# Patient Record
Sex: FEMALE | Race: WHITE | Employment: PART TIME | URBAN - METROPOLITAN AREA
[De-identification: names, ages, dates, MRNs, and addresses within clinical notes are randomized per-mention and may not be internally consistent; named-entity substitution may affect disease eponyms.]

---

## 2021-02-15 ENCOUNTER — OFFICE VISIT (OUTPATIENT)
Dept: PRIMARY CARE CLINIC | Age: 21
End: 2021-02-15
Payer: COMMERCIAL

## 2021-02-15 VITALS
RESPIRATION RATE: 18 BRPM | WEIGHT: 181.2 LBS | HEIGHT: 63 IN | DIASTOLIC BLOOD PRESSURE: 74 MMHG | OXYGEN SATURATION: 100 % | BODY MASS INDEX: 32.11 KG/M2 | HEART RATE: 82 BPM | SYSTOLIC BLOOD PRESSURE: 116 MMHG

## 2021-02-15 DIAGNOSIS — Z00.00 PREVENTATIVE HEALTH CARE: ICD-10-CM

## 2021-02-15 DIAGNOSIS — M54.9 UPPER BACK PAIN ON LEFT SIDE: Primary | ICD-10-CM

## 2021-02-15 DIAGNOSIS — Z76.89 ENCOUNTER TO ESTABLISH CARE: ICD-10-CM

## 2021-02-15 LAB
ALBUMIN SERPL-MCNC: 4.7 G/DL (ref 3.5–4.6)
ALP BLD-CCNC: 83 U/L (ref 40–130)
ALT SERPL-CCNC: 14 U/L (ref 0–33)
ANION GAP SERPL CALCULATED.3IONS-SCNC: 13 MEQ/L (ref 9–15)
AST SERPL-CCNC: 15 U/L (ref 0–35)
BASOPHILS ABSOLUTE: 0.1 K/UL (ref 0–0.2)
BASOPHILS RELATIVE PERCENT: 0.5 %
BILIRUB SERPL-MCNC: <0.2 MG/DL (ref 0.2–0.7)
BUN BLDV-MCNC: 7 MG/DL (ref 6–20)
CALCIUM SERPL-MCNC: 10 MG/DL (ref 8.5–9.9)
CHLORIDE BLD-SCNC: 103 MEQ/L (ref 95–107)
CO2: 25 MEQ/L (ref 20–31)
CREAT SERPL-MCNC: 0.59 MG/DL (ref 0.5–0.9)
EOSINOPHILS ABSOLUTE: 0.1 K/UL (ref 0–0.7)
EOSINOPHILS RELATIVE PERCENT: 1.1 %
GFR AFRICAN AMERICAN: >60
GFR NON-AFRICAN AMERICAN: >60
GLOBULIN: 3.8 G/DL (ref 2.3–3.5)
GLUCOSE BLD-MCNC: 92 MG/DL (ref 70–99)
HBA1C MFR BLD: 5.6 % (ref 4.8–5.9)
HCT VFR BLD CALC: 45.9 % (ref 37–47)
HEMOGLOBIN: 14.7 G/DL (ref 12–16)
LYMPHOCYTES ABSOLUTE: 2.5 K/UL (ref 1–4.8)
LYMPHOCYTES RELATIVE PERCENT: 20.4 %
MCH RBC QN AUTO: 28.4 PG (ref 27–31.3)
MCHC RBC AUTO-ENTMCNC: 32 % (ref 33–37)
MCV RBC AUTO: 88.6 FL (ref 82–100)
MONOCYTES ABSOLUTE: 0.7 K/UL (ref 0.2–0.8)
MONOCYTES RELATIVE PERCENT: 5.6 %
NEUTROPHILS ABSOLUTE: 9 K/UL (ref 1.4–6.5)
NEUTROPHILS RELATIVE PERCENT: 72.4 %
PDW BLD-RTO: 13.6 % (ref 11.5–14.5)
PLATELET # BLD: 345 K/UL (ref 130–400)
POTASSIUM SERPL-SCNC: 4.5 MEQ/L (ref 3.4–4.9)
RBC # BLD: 5.18 M/UL (ref 4.2–5.4)
SODIUM BLD-SCNC: 141 MEQ/L (ref 135–144)
TOTAL PROTEIN: 8.5 G/DL (ref 6.3–8)
TSH REFLEX: 0.95 UIU/ML (ref 0.44–3.86)
WBC # BLD: 12.4 K/UL (ref 4.8–10.8)

## 2021-02-15 PROCEDURE — G8427 DOCREV CUR MEDS BY ELIG CLIN: HCPCS | Performed by: INTERNAL MEDICINE

## 2021-02-15 PROCEDURE — G8484 FLU IMMUNIZE NO ADMIN: HCPCS | Performed by: INTERNAL MEDICINE

## 2021-02-15 PROCEDURE — G8417 CALC BMI ABV UP PARAM F/U: HCPCS | Performed by: INTERNAL MEDICINE

## 2021-02-15 PROCEDURE — 1036F TOBACCO NON-USER: CPT | Performed by: INTERNAL MEDICINE

## 2021-02-15 PROCEDURE — 99204 OFFICE O/P NEW MOD 45 MIN: CPT | Performed by: INTERNAL MEDICINE

## 2021-02-15 RX ORDER — GABAPENTIN 100 MG/1
100 CAPSULE ORAL 3 TIMES DAILY PRN
Qty: 90 CAPSULE | Refills: 1 | Status: SHIPPED | OUTPATIENT
Start: 2021-02-15 | End: 2021-06-02

## 2021-02-15 RX ORDER — KETOROLAC TROMETHAMINE 10 MG/1
10 TABLET, FILM COATED ORAL EVERY 6 HOURS PRN
Qty: 20 TABLET | Refills: 0 | Status: SHIPPED | OUTPATIENT
Start: 2021-02-15 | End: 2022-02-15

## 2021-02-15 SDOH — HEALTH STABILITY: MENTAL HEALTH: HOW MANY STANDARD DRINKS CONTAINING ALCOHOL DO YOU HAVE ON A TYPICAL DAY?: NOT ASKED

## 2021-02-15 SDOH — HEALTH STABILITY: MENTAL HEALTH: HOW OFTEN DO YOU HAVE A DRINK CONTAINING ALCOHOL?: NOT ASKED

## 2021-02-15 SDOH — ECONOMIC STABILITY: TRANSPORTATION INSECURITY
IN THE PAST 12 MONTHS, HAS LACK OF TRANSPORTATION KEPT YOU FROM MEETINGS, WORK, OR FROM GETTING THINGS NEEDED FOR DAILY LIVING?: NO

## 2021-02-15 SDOH — ECONOMIC STABILITY: FOOD INSECURITY: WITHIN THE PAST 12 MONTHS, YOU WORRIED THAT YOUR FOOD WOULD RUN OUT BEFORE YOU GOT MONEY TO BUY MORE.: NEVER TRUE

## 2021-02-15 ASSESSMENT — PATIENT HEALTH QUESTIONNAIRE - PHQ9
SUM OF ALL RESPONSES TO PHQ QUESTIONS 1-9: 0
2. FEELING DOWN, DEPRESSED OR HOPELESS: 0
SUM OF ALL RESPONSES TO PHQ QUESTIONS 1-9: 0
SUM OF ALL RESPONSES TO PHQ9 QUESTIONS 1 & 2: 0
1. LITTLE INTEREST OR PLEASURE IN DOING THINGS: 0
SUM OF ALL RESPONSES TO PHQ QUESTIONS 1-9: 0

## 2021-02-15 ASSESSMENT — ENCOUNTER SYMPTOMS
COUGH: 0
DIARRHEA: 0
ABDOMINAL PAIN: 0
WHEEZING: 0
VOMITING: 0
NAUSEA: 0
BACK PAIN: 1
SHORTNESS OF BREATH: 0

## 2021-02-15 NOTE — PROGRESS NOTES
Subjective:      Patient ID: Ike White is a 24 y.o. female  New patient  HPI  Patient presents to establish care with PCP today. PMHx:anxiety. Denies hx DM   Current meds: none  Family hx of colon cancer: none  Last pap test: never          CC: left upper back pain x 2 months  Pain is crampy, shooting by the left shoulder blade rated 8/10, radiating down the left arm. Assoc arm tingling and numbness, no weakness, no swelling of back. Hx fall in 12/2020. XR shoulder and scapula showed no fracture or dislocation. No help from ibuprofen, no neck pain. Past Medical History:   Diagnosis Date    Anxiety     Depression     Diabetes mellitus (HCC)     Type 2 diabetes mellitus without complication ([facility] [address]      History reviewed. No pertinent surgical history. Social History     Socioeconomic History    Marital status: Single     Spouse name: Not on file    Number of children: Not on file    Years of education: Not on file    Highest education level: Not on file   Occupational History    Not on file   Social Needs    Financial resource strain: Somewhat hard    Food insecurity     Worry: Never true     Inability: Never true    Transportation needs     Medical: No     Non-medical: No   Tobacco Use    Smoking status: Never Smoker    Smokeless tobacco: Never Used   Substance and Sexual Activity    Alcohol use:  Yes    Drug use: Never    Sexual activity: Not on file   Lifestyle    Physical activity     Days per week: Not on file     Minutes per session: Not on file    Stress: Not on file   Relationships    Social connections     Talks on phone: Not on file     Gets together: Not on file     Attends Hindu service: Not on file     Active member of club or organization: Not on file     Attends meetings of clubs or organizations: Not on file     Relationship status: Not on file    Intimate partner violence     Fear of current or ex partner: Not on file     Emotionally abused: Not on file Physically abused: Not on file     Forced sexual activity: Not on file   Other Topics Concern    Not on file   Social History Narrative    Not on file     History reviewed. No pertinent family history. Allergies:  Patient has no known allergies. There is no problem list on file for this patient. No current outpatient medications on file prior to visit. No current facility-administered medications on file prior to visit. Review of Systems   Constitutional: Negative for chills, diaphoresis, fatigue and fever. HENT: Negative for congestion, ear discharge and ear pain. Respiratory: Negative for cough, shortness of breath and wheezing. Cardiovascular: Negative for chest pain. Gastrointestinal: Negative for abdominal pain, diarrhea, nausea and vomiting. Endocrine: Negative for cold intolerance and heat intolerance. Genitourinary: Negative for dysuria and frequency. Musculoskeletal: Positive for back pain. Negative for arthralgias, joint swelling and neck pain. Neurological: Negative for dizziness and light-headedness. Psychiatric/Behavioral: Negative for dysphoric mood. The patient is not nervous/anxious. Objective:   /74 (Site: Left Upper Arm, Position: Sitting, Cuff Size: Medium Adult)   Pulse 82   Resp 18   Ht 5' 3\" (1.6 m)   Wt 181 lb 3.2 oz (82.2 kg)   LMP 10/06/2020 (Approximate)   SpO2 100%   Breastfeeding No   BMI 32.10 kg/m²     Physical Exam  Constitutional:       General: She is not in acute distress. Appearance: Normal appearance. She is not diaphoretic. Cardiovascular:      Rate and Rhythm: Normal rate and regular rhythm. Pulses: Normal pulses. Heart sounds: Normal heart sounds, S1 normal and S2 normal. No murmur. Pulmonary:      Effort: Pulmonary effort is normal. No respiratory distress. Breath sounds: Normal breath sounds. No wheezing or rales. Chest:      Chest wall: No tenderness.    Abdominal: General: Bowel sounds are normal.      Tenderness: There is no abdominal tenderness. Musculoskeletal:      Comments: No erythema or swelling of the back   Marked left infrascapular TTP  No weakness of UE   Neurological:      General: No focal deficit present. Mental Status: She is alert. Cranial Nerves: No cranial nerve deficit. Motor: No weakness. Psychiatric:         Mood and Affect: Mood normal.         Thought Content: Thought content normal.       Assessment:       Diagnosis Orders   1. Upper back pain on left side  [facility]    ketorolac (TORADOL) 10 MG tablet    gabapentin (NEURONTIN) 100 MG capsule   2. Preventative health care  CBC With Auto Differential    Comprehensive Metabolic Panel    TSH with Reflex    HIV Screen    Hemoglobin A1C    Chlamydia trachomatis DNA, Urine    Vero Steve MD, OB-GYN, [place]   3.  Encounter to establish care       Plan:      Orders Placed This Encounter   Procedures    Chlamydia trachomatis DNA, Urine     Standing Status:   Future     Number of Occurrences:   1     Standing Expiration Date:   2/15/2022    CBC With Auto Differential     Standing Status:   Future     Number of Occurrences:   1     Standing Expiration Date:   2/15/2022    Comprehensive Metabolic Panel     Standing Status:   Future     Number of Occurrences:   1     Standing Expiration Date:   2/15/2022    TSH with Reflex     Standing Status:   Future     Number of Occurrences:   1     Standing Expiration Date:   2/15/2022    HIV Screen     Standing Status:   Future     Number of Occurrences:   1     Standing Expiration Date:   2/15/2022    Hemoglobin A1C     Standing Status:   Future     Number of Occurrences:   1     Standing Expiration Date:   2/15/2022   Winnie Cleveland MD, OB-GYN, [place]     Referral Priority:   Routine     Referral Type:   Eval and Treat     Referral Reason:   Specialty Services Required Referred to Provider:   Khadra Mcintosh MD     Requested Specialty:   Obstetrics & Gynecology     Number of Visits Requested:   [address]     Referral Priority:   Routine     Referral Type:   Eval and Treat     Referral Reason:   Specialty Services Required     Requested Specialty:   Physical Therapy     Number of Visits Requested:   1     Orders Placed This Encounter   Medications    ketorolac (TORADOL) 10 MG tablet     Sig: Take 1 tablet by mouth every 6 hours as needed for Pain     Dispense:  20 tablet     Refill:  0    gabapentin (NEURONTIN) 100 MG capsule     Sig: Take 1 capsule by mouth 3 times daily as needed (numbness, tingling) for up to 30 days. Intended supply: 90 days     Dispense:  90 capsule     Refill:  1     Return in about 1 month (around 3/15/2021) for assessment of response to treatment.

## 2021-02-16 DIAGNOSIS — D72.829 LEUKOCYTOSIS, UNSPECIFIED TYPE: Primary | ICD-10-CM

## 2021-02-18 LAB — HIV AG/AB: NONREACTIVE

## 2021-02-19 LAB — C. TRACHOMATIS DNA ,URINE: NEGATIVE

## 2021-02-25 ENCOUNTER — HOSPITAL ENCOUNTER (OUTPATIENT)
Dept: PHYSICAL THERAPY | Age: 21
Setting detail: THERAPIES SERIES
Discharge: HOME | End: 2021-02-25
Payer: COMMERCIAL

## 2021-02-25 PROCEDURE — 97162 PT EVAL MOD COMPLEX 30 MIN: CPT

## 2021-02-25 PROCEDURE — 97110 THERAPEUTIC EXERCISES: CPT

## 2021-02-25 ASSESSMENT — PAIN DESCRIPTION - ORIENTATION: ORIENTATION: LEFT

## 2021-02-25 ASSESSMENT — PAIN SCALES - GENERAL: PAINLEVEL_OUTOF10: 5

## 2021-02-25 NOTE — PLAN OF CARE
[address]   [address] [place], [place]  Phone:  [phone]   Fax:  [phone]    [] Certification  [] Recertification [x]  Plan of Care  [] Progress Note   [] Discharge        To: Purnima Stack MD  From:  Sena Navarro, ANGÉLICA  Patient: Russell Duffy     : 2000  Diagnosis: Upper back pain on left side     Date: 2021  Treatment Diagnosis: decreased UE strength, decreased left shoulder ROM, left scapula pain       Progress Report Period from: 21  to 2021    Total # of Visits to Date: 1   No Show: 0    Canceled Appointment: 0     OBJECTIVE:   Short Term Goals - Time Frame for Short term goals: 4 weeks    Goals Current/Discharge status  Met   Short term goal 1: Patient will report </= 1/10 pain in left upper back and scapula with work activities. Patient reports 5/10 pain in left upper back and scapula at rest. [] yes  [x] no   Short term goal 2: Patient will be independent with HEP. Patient issued HEP. [] yes  [x] no     Long Term Goals - Time Frame for Long term goals : 6 weeks  Goals Current/ Discharge status Met   Long term goal 1: Patient will increase left shoulder ROM to [facility] for improved reach. AROM LUE (degrees)  L Shoulder Flexion 0-180: 140 deg (stiffness at end range)  L Shoulder Extension 0-45: 46 deg  L Shoulder ABduction 0-180: 96 deg (stiffness at end range)  L Shoulder Int Rotation  0-70: 20 deg  L Shoulder Ext Rotation  0-90: 31 deg          yes  [x] no   Long term goal 2: Patient will increase strength in left shoulder and periscapulars >/= 1/2 manual muscle grade for improved tolerance.        Strength RUE  Comment: rhomboid/middle trap 3+/5  R Shoulder Flexion: 4/5  R Shoulder Extension: 4/5  R Shoulder ABduction: 4/5  R Shoulder Internal Rotation: 4/5  R Shoulder External Rotation: 4/5  R Elbow Flexion: 4/5  R Elbow Extension: 4-/5  R Wrist Flexion: 4+/5  R Wrist Extension: 4+/5  Strength LUE  Comment: rhomboid/middle trap 3/5  L Shoulder Flexion: 4/5  L Shoulder Extension: 4/5  L Shoulder ABduction: 4/5  L Shoulder Internal Rotation: 4/5  L Shoulder External Rotation: 4/5  L Elbow Flexion: 4/5  L Elbow Extension: 4/5  L Wrist Flexion: 4+/5  L Wrist Extension: 4+/5      [] yes  [x] no   Long term goal 3: UEFI >/= 62/80 to demonstrate functional improvements. UEFI: 52/80 [] yes  [x] no     Body structures, Functions, Activity limitations: Decreased ROM, Decreased strength, Increased pain, Decreased sensation, Decreased ADL status  Assessment: Patient reports fall in December onto left scapula reporting continued pain and numbness/tingling down left arm. Upon PT evaluation, patient demonstrates decline in left shoulder ROM and strength. Further PT recommended to decrease pain and improve ROM/strength for overall quality of life. Prognosis: Good  New Education Provided: PT Education: Goals, PT Role, Plan of Care, Home Exercise Program    PLAN: [x] Evaluate and Treat  Frequency/Duration:  Plan  Times per week: 2  Plan weeks: 6  Current Treatment Recommendations: Strengthening, ROM, Endurance Training, Neuromuscular Re-education, Manual Therapy - Soft Tissue Mobilization, Manual Therapy - Joint Manipulation, Home Exercise Program, Modalities, Patient/Caregiver Education & Training     Precautions:             Patient Status:[x] Continue/ Initate plan of Care    [] Discharge PT. Recommend pt continue with HEP. [] Additional visits requested, Please re-certify for additional visits:        Signature: Electronically signed by Jose M Rvias PT on 2/25/21 at 12:52 PM EST      If you have any questions or concerns, please don't hesitate to call. Thank you for your referral.    I have reviewed this plan of care and certify a need for medically necessary rehabilitation services.     Physician Signature:__________________________________________________________  Date:  Please sign and return

## 2021-02-25 NOTE — PROGRESS NOTES
[facility]   Outpatient Physical Therapy   Evaluation      [] [address]  [x] [address] [facility]     Date: 2021  Patient: Reggie Arceo  : 2000  ACCT #: [de-identified]  Referring physician: Referring Practitioner: Mari Hood MD    Referring Practitioner: Mari Hood MD    Referral Date : 02/15/21    Diagnosis: Upper back pain on left side    Treatment Diagnosis: decreased UE strength, decreased left shoulder ROM, left scapula pain  PT Insurance Information: [facility]  Total # of Visits Approved: 30   Total # of Visits to Date: 1  No Show: 0  Canceled Appointment: 0          History   has a past medical history of Anxiety, Depression, Diabetes mellitus ([facility] [address] and Type 2 diabetes mellitus without complication ([facility] [address] has no past surgical history on file. No Known Allergies  Current Outpatient Medications on File Prior to Encounter   Medication Sig Dispense Refill    ketorolac (TORADOL) 10 MG tablet Take 1 tablet by mouth every 6 hours as needed for Pain 20 tablet 0    gabapentin (NEURONTIN) 100 MG capsule Take 1 capsule by mouth 3 times daily as needed (numbness, tingling) for up to 30 days. Intended supply: 90 days 90 capsule 1     No current facility-administered medications on file prior to encounter. Subjective  Subjective: Patient report fall on 20 in house slipping into doorway. Patient went to urgent care in January due to pain that started back up starting in left shoulder blade down pain, numbness and tingling (intermittent). Increase symptoms lifting, stocking, pushing, and pulling. Decreased pain with rest.  Reports hx of tendionitis in bilateral wrists. X-ray was negative for fractures. Patient is right handed.   Additional Pertinent Hx: hx of right leg fx, depression, anxiety, hx of ear infections, right elbow surgery  Pain Screening  Patient Currently in Pain: Yes  Pain Assessment  Pain Assessment: 0-10  Pain Level: 5  Pain Location: Arm, Back  Pain Orientation: Left  Pain Descriptors: Dull    Social/Functional History  Lives With: Family  Type of Home: House  Home Layout: Two level  ADL Assistance: Independent  Homemaking Assistance: Independent  Ambulation Assistance: Independent  Transfer Assistance: Independent  Type of occupation:  and mamie         Objective  Vision  Vision: Within Functional Limits  Hearing  Hearing: Within functional limits  Observation/Palpation  Palpation: increased tenderness to palpation along medial border of scapula and inferior angle  Edema: slight edema around scapula    Strength RUE  Comment: rhomboid/middle trap 3+/5  R Shoulder Flexion: 4/5  R Shoulder Extension: 4/5  R Shoulder ABduction: 4/5  R Shoulder Internal Rotation: 4/5  R Shoulder External Rotation: 4/5  R Elbow Flexion: 4/5  R Elbow Extension: 4-/5  R Wrist Flexion: 4+/5  R Wrist Extension: 4+/5  Strength LUE  Comment: rhomboid/middle trap 3/5  L Shoulder Flexion: 4/5  L Shoulder Extension: 4/5  L Shoulder ABduction: 4/5  L Shoulder Internal Rotation: 4/5  L Shoulder External Rotation: 4/5  L Elbow Flexion: 4/5  L Elbow Extension: 4/5  L Wrist Flexion: 4+/5  L Wrist Extension: 4+/5                       AROM LUE (degrees)  L Shoulder Flexion 0-180: 140 deg (stiffness at end range)  L Shoulder Extension 0-45: 46 deg  L Shoulder ABduction 0-180: 96 deg (stiffness at end range)  L Shoulder Int Rotation  0-70: 20 deg  L Shoulder Ext Rotation  0-90: 31 deg                Additional Measures  Other: UEFI: 52/80  Sensation  Overall Sensation Status: Impaired  Additional Comments: numbness and tingling down left UE  Additional Comments: numbness and tingling down left UE            Ambulation 1  Device: No Device  Assistance: Independent  Gait Deviations: None  Distance: clinical distance in department                      Exercises:   Exercises  Exercise 1: wand ex flexion, abduction, ER 5s x 5  Exercise 2: table slides flexion, scaption 5s x 5  Exercise 3: *posture ex  Exercise 4: *pulleys  Exercise 5: *Tband rows/lats  Exercise 20: HEP: table slides, wand ex    Manual:  Manual therapy  PROM: *left shoulder  Soft Tissue Mobalization: *STM periscapular    Modalities:  Modalities  Moist heat: *PRN  Cryotherapy (Minutes\Location): *PRN  Ultrasound: *PRN  E-stim (parameters): *PRN      ** indicates treatment to be performed at future treatment     POST-PAIN    Pain Rating (0-10 pain scale): 5/10  Location and Pain Description same as pre-pain unless otherwise indicated. Action: [] NA  [] Call Physician  [x] Perform HEP  [x] Meds as prescribed     Assessment   Conditions Requiring Skilled Therapeutic Intervention  Body structures, Functions, Activity limitations: Decreased ROM, Decreased strength, Increased pain, Decreased sensation, Decreased ADL status  Assessment: Patient reports fall in December onto left scapula reporting continued pain and numbness/tingling down left arm. Upon PT evaluation, patient demonstrates decline in left shoulder ROM and strength. Further PT recommended to decrease pain and improve ROM/strength for overall quality of life. Treatment Diagnosis: decreased UE strength, decreased left shoulder ROM, left scapula pain  Prognosis: Good  Decision Making: Medium Complexity  History: anxiety, depression, ear infection, hx of ear infection  Exam: decreased left shoulder ROM, decreased UE strength, increased left scapular pain  Clinical Presentation: evolving  REQUIRES PT FOLLOW UP: Yes    Patient Education   PT Education: Goals, PT Role, Plan of Care, Home Exercise Program    Pt verbalized/demonstrated good understanding:     [x] Yes         [] No, pt required further clarification.     Goals   Patient goal: Patient goals : \"to help my pinched nerve\"    Short term goals  Time Frame for Short term goals: 4 weeks  Short term goal 1: Patient will report </= 1/10 pain in left upper back and scapula with work activities. Short term goal 2: Patient will be independent with HEP. Long term goals  Time Frame for Long term goals : 6 weeks  Long term goal 1: Patient will increase left shoulder ROM to [facility] for improved reach. Long term goal 2: Patient will increase strength in left shoulder and periscapulars >/= 1/2 manual muscle grade for improved tolerance. Long term goal 3: UEFI >/= 62/80 to demonstrate functional improvements. Plan:  Plan  Times per week: 2  Plan weeks: 6  Current Treatment Recommendations: Strengthening, ROM, Endurance Training, Neuromuscular Re-education, Manual Therapy - Soft Tissue Mobilization, Manual Therapy - Joint Manipulation, Home Exercise Program, Modalities, Patient/Caregiver Education & Training       Evaluation and patient rights have been reviewed and patient agrees with plan of care. Yes  [x]  No  []   Explain:     Signature: Electronically signed by Billy Pugh PT on 2/25/2021 at 12:50 PM      PT Individual Minutes  Time In: 9403  Time Out: 9240  Minutes: 40  Timed Code Treatment Minutes: 10 Minutes  Procedure Minutes: 30 PT evaluation    [place] Fall Risk Assessment  Risk Factor Scale  Score   History of Falls [] Yes  [x] No 25  0 0   Secondary Diagnosis [] Yes  [x] No 15  0 0   Ambulatory Aid [] Furniture  [] Crutches/cane/walker  [x] None/bedrest/wheelchair/nurse 30  15  0 0   IV/Heparin Lock [] Yes  [x] No 20  0 0   Gait/Transferring [] Impaired  [] Weak  [x] Normal/bedrest/immobile 20  10  0 0   Mental Status [] Forgets limitations  [x] Oriented to own ability 15  0 0      Total:0     Based on the Assessment score: check the appropriate box.   [x]  No intervention needed   Low =   Score of 0-24  []  Use standard prevention interventions Moderate =  Score of 24-44   [] Discuss fall prevention strategies   [] Indicate moderate falls risk on eval  []  Use high risk prevention interventions High = Score of 45 and higher   [] Discuss fall prevention strategies   [] Provide supervision during treatment time

## 2021-03-01 ENCOUNTER — HOSPITAL ENCOUNTER (OUTPATIENT)
Dept: PHYSICAL THERAPY | Age: 21
Setting detail: THERAPIES SERIES
Discharge: HOME | End: 2021-03-01
Payer: COMMERCIAL

## 2021-03-01 PROCEDURE — G0283 ELEC STIM OTHER THAN WOUND: HCPCS

## 2021-03-01 PROCEDURE — 97140 MANUAL THERAPY 1/> REGIONS: CPT

## 2021-03-01 PROCEDURE — 97110 THERAPEUTIC EXERCISES: CPT

## 2021-03-01 ASSESSMENT — PAIN DESCRIPTION - LOCATION: LOCATION: BACK

## 2021-03-01 ASSESSMENT — PAIN DESCRIPTION - DESCRIPTORS: DESCRIPTORS: ACHING;THROBBING

## 2021-03-01 NOTE — PROGRESS NOTES
[facility]   Outpatient Physical Therapy   Treatment Note  [] [address]  [x] [facility]            of [address]  Date: 3/1/2021  Patient: Veronica Bearden  : 2000  ACCT #: [de-identified]  Referring Practitioner: Colletta Bally MD  Diagnosis: Upper back pain on left side    Visit Information:  PT Visit Information  PT Insurance Information: [employer]  Total # of Visits Approved: 30  Total # of Visits to Date: 2  No Show: 0  Canceled Appointment: 0  Progress Note Counter:     SUBJECTIVE:   Subjective  Subjective: Pt reports some increased soreness after last treatment and work/  HEP Compliance:  [x] Good [] Fair [] Poor [] Reports not doing due to:    PAIN   Location:   Pain Location: Back  Pain Rating (0-10 pain scale):  Pain Level: 6  Pain Description:  Pain Descriptors: Aching, Throbbing  Action:  [x] Acceptable for treatment  []  Other:    OBJECTIVE:   Exercises  Exercise 1: wand ex flexion, abduction, ER 5s x 5  Exercise 2: table slides flexion, scaption 10s x 10  Exercise 3: posture ex x10   Exercise 20: HEp cont current     Manual: []  NA   Manual therapy  Soft Tissue Mobalization: stm with and without tennis ball. Modalities: []  NA  Modalities  Moist heat: 10 min with IFC   E-stim (parameters): 10 min lt upper back. HEP  Continue with current Home Exercise Program.  See Objective section for progression of HEP. Comments:       POST-PAIN    Pain Rating (0-10 pain scale): 5/10  Location and Pain Description same as pre-pain unless otherwise indicated. Action: [] NA  [] Call Physician  [x] Perform HEP  [] Meds as prescribed     ASSESSMENT:     Conditions Requiring Skilled Therapeutic Intervention  Assessment: Pt with increased pain er with cane. Pt with ok ricco to light manual therapy.          Tolerance to treatment:  [x] Good   [] Fair   [] Poor  [] Fatigued   [] Increased pain   Limited by:    Goals:     Short term goals  Time Frame for Short term goals: 4 weeks  Short term goal 1: Patient will report </= 1/10 pain in left upper back and scapula with work activities. Short term goal 2: Patient will be independent with HEP. Long term goals  Time Frame for Long term goals : 6 weeks  Long term goal 1: Patient will increase left shoulder ROM to [facility] for improved reach. Long term goal 2: Patient will increase strength in left shoulder and periscapulars >/= 1/2 manual muscle grade for improved tolerance. Long term goal 3: UEFI >/= 62/80 to demonstrate functional improvements. Progress toward goals: pain  Goals Met:    []  See updated POC   Comments:    PLAN:  [x] Continue POC to pt tolerance  [] Hold PT for ___ days.   See note to physician  [] Discharge PT    Signature:   Electronically signed by Yolanda Alexis PTA on 3/1/21 at 3:24 PM EST    PT Individual Minutes  Time In: 1400  Time Out: 3096  Minutes: 50  Timed Code Treatment Minutes: 40 Minutes    Activity Minutes Units   Ther Ex 30 2   Manual   10  1   Estim 10 1

## 2021-03-03 ENCOUNTER — OFFICE VISIT (OUTPATIENT)
Dept: OBGYN CLINIC | Age: 21
End: 2021-03-03
Payer: COMMERCIAL

## 2021-03-03 VITALS
HEIGHT: 64 IN | DIASTOLIC BLOOD PRESSURE: 78 MMHG | BODY MASS INDEX: 31.41 KG/M2 | WEIGHT: 184 LBS | SYSTOLIC BLOOD PRESSURE: 132 MMHG

## 2021-03-03 DIAGNOSIS — Z01.419 WOMEN'S ANNUAL ROUTINE GYNECOLOGICAL EXAMINATION: Primary | ICD-10-CM

## 2021-03-03 DIAGNOSIS — Z11.51 SCREENING FOR HUMAN PAPILLOMAVIRUS: ICD-10-CM

## 2021-03-03 DIAGNOSIS — D72.829 LEUKOCYTOSIS, UNSPECIFIED TYPE: ICD-10-CM

## 2021-03-03 DIAGNOSIS — N92.6 IRREGULAR PERIODS/MENSTRUAL CYCLES: ICD-10-CM

## 2021-03-03 DIAGNOSIS — Z01.419 WOMEN'S ANNUAL ROUTINE GYNECOLOGICAL EXAMINATION: ICD-10-CM

## 2021-03-03 LAB
BASOPHILS ABSOLUTE: 0 K/UL (ref 0–0.2)
BASOPHILS RELATIVE PERCENT: 0.5 %
EOSINOPHILS ABSOLUTE: 0.2 K/UL (ref 0–0.7)
EOSINOPHILS RELATIVE PERCENT: 1.7 %
HCT VFR BLD CALC: 42.4 % (ref 37–47)
HEMOGLOBIN: 13.7 G/DL (ref 12–16)
LYMPHOCYTES ABSOLUTE: 2 K/UL (ref 1–4.8)
LYMPHOCYTES RELATIVE PERCENT: 22.8 %
MCH RBC QN AUTO: 29.1 PG (ref 27–31.3)
MCHC RBC AUTO-ENTMCNC: 32.4 % (ref 33–37)
MCV RBC AUTO: 89.8 FL (ref 82–100)
MONOCYTES ABSOLUTE: 0.5 K/UL (ref 0.2–0.8)
MONOCYTES RELATIVE PERCENT: 5.7 %
NEUTROPHILS ABSOLUTE: 6.1 K/UL (ref 1.4–6.5)
NEUTROPHILS RELATIVE PERCENT: 69.3 %
PDW BLD-RTO: 14.2 % (ref 11.5–14.5)
PLATELET # BLD: 363 K/UL (ref 130–400)
RBC # BLD: 4.73 M/UL (ref 4.2–5.4)
WBC # BLD: 8.8 K/UL (ref 4.8–10.8)

## 2021-03-03 PROCEDURE — 1036F TOBACCO NON-USER: CPT | Performed by: OBSTETRICS & GYNECOLOGY

## 2021-03-03 PROCEDURE — G8417 CALC BMI ABV UP PARAM F/U: HCPCS | Performed by: OBSTETRICS & GYNECOLOGY

## 2021-03-03 PROCEDURE — 99385 PREV VISIT NEW AGE 18-39: CPT | Performed by: OBSTETRICS & GYNECOLOGY

## 2021-03-03 PROCEDURE — G8427 DOCREV CUR MEDS BY ELIG CLIN: HCPCS | Performed by: OBSTETRICS & GYNECOLOGY

## 2021-03-03 PROCEDURE — G8484 FLU IMMUNIZE NO ADMIN: HCPCS | Performed by: OBSTETRICS & GYNECOLOGY

## 2021-03-03 ASSESSMENT — ENCOUNTER SYMPTOMS
ANAL BLEEDING: 0
RECTAL PAIN: 0
VOMITING: 0
RESPIRATORY NEGATIVE: 1
EYES NEGATIVE: 1
ALLERGIC/IMMUNOLOGIC NEGATIVE: 1
ABDOMINAL DISTENTION: 0
DIARRHEA: 0
NAUSEA: 0
BLOOD IN STOOL: 0
ABDOMINAL PAIN: 0
CONSTIPATION: 0

## 2021-03-03 ASSESSMENT — VISUAL ACUITY: OU: 1

## 2021-03-03 NOTE — PROGRESS NOTES
The patient was asked if she would like a chaperone present for her intimate exam. She  Declined the chaperone.  Gonzalo Gottlieb

## 2021-03-03 NOTE — PROGRESS NOTES
Subjective:      Patient ID: Josie Harvey is a 24 y.o. female    Annual exam. New patient; reviewed medical, surgical, social and family history. Also reviewed current medications and allergies. No GYN complaints. Irregular cycles ( oligomenorrhea ). Recent labs with PCP essentially WNL. Pap performed. STD screening offered. Monthly SBE encouraged. SA with condoms. F/U annual or prn. Pt also wished to discuss contraceptive options for cycle regulation and contraception  extending her appointment time by 10 minutes. Discussed  medical management for irregular cycles. Discussed OCP, Depo Provera, Nexplanon, Nuvaring, Mirena and Shantelle IUD. Discussed risks and benefits of each method and all questions answered. After discussion, patient opted to start Ortho-cyclen. F/U 3 months for med check. If cycles still irregular on new OCP, will order additional labs and US. All questions answered. Vitals:  /78   Ht 5' 3.5\" (1.613 m)   Wt 184 lb (83.5 kg)   LMP 02/26/2021   BMI 32.08 kg/m²   Past Medical History:   Diagnosis Date    Anxiety     Depression     Diabetes mellitus ([facility]     Pinched nerve     Type 2 diabetes mellitus without complication ([facility]      Past Surgical History:   Procedure Laterality Date    ARM SURGERY       Allergies:  Patient has no known allergies. Current Outpatient Medications   Medication Sig Dispense Refill    ketorolac (TORADOL) 10 MG tablet Take 1 tablet by mouth every 6 hours as needed for Pain 20 tablet 0    gabapentin (NEURONTIN) 100 MG capsule Take 1 capsule by mouth 3 times daily as needed (numbness, tingling) for up to 30 days. Intended supply: 90 days 90 capsule 1     No current facility-administered medications for this visit.       Social History     Socioeconomic History    Marital status: Single     Spouse name: Not on file    Number of children: Not on file    Years of education: Not on file  Highest education level: Not on file   Occupational History    Not on file   Social Needs    Financial resource strain: Somewhat hard    Food insecurity     Worry: Never true     Inability: Never true    Transportation needs     Medical: No     Non-medical: No   Tobacco Use    Smoking status: Never Smoker    Smokeless tobacco: Never Used   Substance and Sexual Activity    Alcohol use: Yes    Drug use: Never    Sexual activity: Not Currently   Lifestyle    Physical activity     Days per week: Not on file     Minutes per session: Not on file    Stress: Not on file   Relationships    Social connections     Talks on phone: Not on file     Gets together: Not on file     Attends Mos[employer] service: Not on file     Active member of club or organization: Not on file     Attends meetings of clubs or organizations: Not on file     Relationship status: Not on file    Intimate partner violence     Fear of current or ex partner: Not on file     Emotionally abused: Not on file     Physically abused: Not on file     Forced sexual activity: Not on file   Other Topics Concern    Not on file   Social History Narrative    Not on file     Family History   Problem Relation Age of Onset    Thyroid Disease Other        Review of Systems   Constitutional: Negative. Negative for activity change, appetite change, chills, diaphoresis, fatigue, fever and unexpected weight change. HENT: Negative. Eyes: Negative. Respiratory: Negative. Cardiovascular: Negative. Gastrointestinal: Negative for abdominal distention, abdominal pain, anal bleeding, blood in stool, constipation, diarrhea, nausea, rectal pain and vomiting. Endocrine: Negative. Vagina: Normal. No signs of injury and foreign body. No vaginal discharge, erythema, tenderness or bleeding. Cervix: No cervical motion tenderness, discharge or friability. Uterus: Not deviated, not enlarged, not fixed and not tender. Adnexa:         Right: No mass, tenderness or fullness. Left: No mass, tenderness or fullness. Rectum: Normal.   Musculoskeletal: Normal range of motion. General: No tenderness. Lymphadenopathy:      Cervical: No cervical adenopathy. Upper Body:      Right upper body: No supraclavicular or axillary adenopathy. Left upper body: No supraclavicular or axillary adenopathy. Lower Body: No right inguinal adenopathy. No left inguinal adenopathy. Skin:     General: Skin is warm and dry. Coloration: Skin is not pale. Findings: No erythema or rash. Neurological:      Mental Status: She is alert and oriented to person, place, and time. Psychiatric:         Behavior: Behavior normal.         Thought Content: Thought content normal.         Judgment: Judgment normal.         Assessment:       Diagnosis Orders   1. Women's annual routine gynecological examination  PAP SMEAR   2. Screening for human papillomavirus  PAP SMEAR   3. Irregular periods/menstrual cycles          Plan:      Medications placedthis encounter:  No orders of the defined types were placed in this encounter. Orders placedthis encounter:  Orders Placed This Encounter   Procedures    PAP SMEAR     Patient History:    No LMP recorded. (Menstrual status: Irregular periods). OBGYN Status: Irregular periods  No past surgical history on file. Social History    Tobacco Use      Smoking status: Never Smoker      Smokeless tobacco: Never Used       Standing Status:   Future     Standing Expiration Date:   3/3/2022     Order Specific Question:   Collection Type     Answer:    Thin Prep     Order Specific Question:   Prior Abnormal Pap Test

## 2021-03-04 ENCOUNTER — HOSPITAL ENCOUNTER (OUTPATIENT)
Dept: PHYSICAL THERAPY | Age: 21
Setting detail: THERAPIES SERIES
Discharge: HOME | End: 2021-03-04
Payer: COMMERCIAL

## 2021-03-04 PROCEDURE — G0283 ELEC STIM OTHER THAN WOUND: HCPCS

## 2021-03-04 PROCEDURE — 97110 THERAPEUTIC EXERCISES: CPT

## 2021-03-04 PROCEDURE — 97140 MANUAL THERAPY 1/> REGIONS: CPT

## 2021-03-04 ASSESSMENT — PAIN SCALES - GENERAL: PAINLEVEL_OUTOF10: 6

## 2021-03-04 ASSESSMENT — PAIN DESCRIPTION - LOCATION: LOCATION: BACK

## 2021-03-04 NOTE — PROGRESS NOTES
[facility]   Outpatient Physical Therapy   Treatment Note  [] [address]  [x] [facility] CENTER            of [address]  Date: 3/4/2021  Patient: Harsh Cowart  : 2000  ACCT #: [de-identified]  Referring Practitioner: Vasu Topete MD  Diagnosis: Upper back pain on left side    Visit Information:  PT Visit Information  PT Insurance Information: [employer]  Total # of Visits Approved: 30  Total # of Visits to Date: 3  No Show: 0  Canceled Appointment: 0  Progress Note Counter: 3/12    SUBJECTIVE:   Subjective  Subjective: pt reports pain is a little worse today was bounging niece up and down. HEP Compliance:  [x] Good [] Fair [] Poor [] Reports not doing due to:    PAIN   Location:   Pain Location: Back  Pain Rating (0-10 pain scale):  Pain Level: 6  Pain Description:  Pain Descriptors: Aching, Throbbing  Action:  [x] Acceptable for treatment  []  Other:    OBJECTIVE:   Exercises  Exercise 1: wand ex flexion, abduction, ER 5s x 5  Exercise 2: table slides flexion, scaption 10s x 10  Exercise 3: posture ex x10   Exercise 4: pulleys x5 min   Exercise 6: initiated barrel stretch 3x30   Exercise 20: HEP cont current     Manual: []  NA   Manual therapy  Soft Tissue Mobalization: stm with and without tennis ball. Other: cupping glide with     Modalities: []  NA  Modalities  Moist heat: 15 min with IFC   E-stim (parameters): 15 min lt upper back. HEP  Continue with current Home Exercise Program.  See Objective section for progression of HEP. Comments:       POST-PAIN    Pain Rating (0-10 pain scale): 0/10  Location and Pain Description same as pre-pain unless otherwise indicated. Action: [] NA  [] Call Physician  [x] Perform HEP  [] Meds as prescribed     ASSESSMENT:     Conditions Requiring Skilled Therapeutic Intervention  Assessment: Pt with good ricco to cupping. Pt with cont increased tightness with exercises.          Tolerance to treatment:  [x] Good   [] Fair   [] Poor  [] Fatigued   [] Increased pain   Limited by:    Goals:     Short term goals  Time Frame for Short term goals: 4 weeks  Short term goal 1: Patient will report </= 1/10 pain in left upper back and scapula with work activities. Short term goal 2: Patient will be independent with HEP. Long term goals  Time Frame for Long term goals : 6 weeks  Long term goal 1: Patient will increase left shoulder ROM to [facility] for improved reach. Long term goal 2: Patient will increase strength in left shoulder and periscapulars >/= 1/2 manual muscle grade for improved tolerance. Long term goal 3: UEFI >/= 62/80 to demonstrate functional improvements. Progress toward goals: pain  Goals Met:    []  See updated POC   Comments:    PLAN:  [x] Continue POC to pt tolerance  [] Hold PT for ___ days.   See note to physician  [] Discharge PT    Signature:   Electronically signed by Carla Echeverria PTA on 3/4/21 at 3:55 PM EST    PT Individual Minutes  Time In: 1597  Time Out: 1400  Minutes: 55  Timed Code Treatment Minutes: 40 Minutes    Activity Minutes Units   Ther Ex 30 2   Manual   10  1   Estim 15 1

## 2021-03-08 ENCOUNTER — HOSPITAL ENCOUNTER (OUTPATIENT)
Dept: PHYSICAL THERAPY | Age: 21
Setting detail: THERAPIES SERIES
Discharge: HOME | End: 2021-03-08
Payer: COMMERCIAL

## 2021-03-08 PROCEDURE — 97110 THERAPEUTIC EXERCISES: CPT

## 2021-03-08 PROCEDURE — G0283 ELEC STIM OTHER THAN WOUND: HCPCS

## 2021-03-08 PROCEDURE — 97140 MANUAL THERAPY 1/> REGIONS: CPT

## 2021-03-08 ASSESSMENT — PAIN SCALES - GENERAL: PAINLEVEL_OUTOF10: 2

## 2021-03-08 ASSESSMENT — PAIN DESCRIPTION - LOCATION: LOCATION: BACK

## 2021-03-08 NOTE — PROGRESS NOTES
[facility]   Outpatient Physical Therapy   Treatment Note  [] [address]  [] [address] [facility]  Date: 3/8/2021  Patient: Trevor Pink  : 2000  ACCT #: [de-identified]  Referring Practitioner: Kurt Morales MD  Diagnosis: Upper back pain on left side    Visit Information:  PT Visit Information  PT Insurance Information: [employer]  Total # of Visits Approved: 30  Total # of Visits to Date: 4  No Show: 0  Canceled Appointment: 0  Progress Note Counter:     SUBJECTIVE:   Subjective  Subjective: pt reports cont with some increased pain with work. HEP Compliance:  [x] Good [] Fair [] Poor [] Reports not doing due to:    PAIN   Location:   Pain Location: Back  Pain Rating (0-10 pain scale):  Pain Level: 2  Pain Description:  Pain Descriptors: Aching  Action:  [x] Acceptable for treatment  []  Other:    OBJECTIVE:   Exercises  Exercise 1: wand ex flexion, abduction, ER 5s x 5  Exercise 2: rail slides 10 sec x10 flexion and abductoin  Exercise 3: posture ex x15  Exercise 4: pulleys x5 min  3 min flexion 2 min abduction  Exercise 5: initiaited rows and lats YTB x10   Exercise 6:  barrel stretch 3x30   Exercise 20: HEP rail slides     Manual: []  NA   Manual therapy  Soft Tissue Mobalization: stm with and without tennis ball. Other: cupping glide with good ricco to lt scap     Modalities: []  NA  Modalities  Moist heat: 10 min with IFC   E-stim (parameters): 10 min lt upper back. HEP  Continue with current Home Exercise Program.  See Objective section for progression of HEP. Comments:       POST-PAIN    Pain Rating (0-10 pain scale): 0/10  Location and Pain Description same as pre-pain unless otherwise indicated. Action: [] NA  [] Call Physician  [x] Perform HEP  [] Meds as prescribed     ASSESSMENT:     Conditions Requiring Skilled Therapeutic Intervention  Assessment: Pt with fatigue with pulleys and rail slides in the scap area.  Pt with good ricco to manual therapy. Tolerance to treatment:  [x] Good   [] Fair   [] Poor  [] Fatigued   [] Increased pain   Limited by:    Goals:     Short term goals  Time Frame for Short term goals: 4 weeks  Short term goal 1: Patient will report </= 1/10 pain in left upper back and scapula with work activities. Short term goal 2: Patient will be independent with HEP. Long term goals  Time Frame for Long term goals : 6 weeks  Long term goal 1: Patient will increase left shoulder ROM to [facility] for improved reach. Long term goal 2: Patient will increase strength in left shoulder and periscapulars >/= 1/2 manual muscle grade for improved tolerance. Long term goal 3: UEFI >/= 62/80 to demonstrate functional improvements. Progress toward goals: pain   Goals Met:    []  See updated POC   Comments:    PLAN:  [x] Continue POC to pt tolerance  [] Hold PT for ___ days.   See note to physician  [] Discharge PT    Signature:   Electronically signed by Coy Cho PTA on 3/8/21 at 3:41 PM EST    PT Individual Minutes  Time In: 6434  Time Out: 1500  Minutes: 52  Timed Code Treatment Minutes: 42 Minutes    Activity Minutes Units   Ther Ex 32 2   Manual   10 1    Estim 10 1

## 2021-03-09 LAB
HPV COMMENT: NORMAL
HPV TYPE 16: NOT DETECTED
HPV TYPE 18: NOT DETECTED
HPVOH (OTHER TYPES): NOT DETECTED

## 2021-03-11 ENCOUNTER — HOSPITAL ENCOUNTER (OUTPATIENT)
Dept: PHYSICAL THERAPY | Age: 21
Setting detail: THERAPIES SERIES
Discharge: HOME | End: 2021-03-11
Payer: COMMERCIAL

## 2021-03-11 PROCEDURE — 97140 MANUAL THERAPY 1/> REGIONS: CPT

## 2021-03-11 PROCEDURE — 97110 THERAPEUTIC EXERCISES: CPT

## 2021-03-11 PROCEDURE — G0283 ELEC STIM OTHER THAN WOUND: HCPCS

## 2021-03-11 ASSESSMENT — PAIN DESCRIPTION - ORIENTATION: ORIENTATION: LEFT

## 2021-03-11 ASSESSMENT — PAIN SCALES - GENERAL: PAINLEVEL_OUTOF10: 3

## 2021-03-11 NOTE — PROGRESS NOTES
[facility]   Outpatient Physical Therapy   Treatment Note  [] [address]  [] [address] [facility]  Date: 3/11/2021  Patient: Veronica Bearden  : 2000  ACCT #: [de-identified]  Referring Practitioner: Colletta Bally MD  Diagnosis: Upper back pain on left side    Visit Information:  PT Visit Information  PT Insurance Information: [employer]  Total # of Visits Approved: 30  Total # of Visits to Date: 5  No Show: 0  Canceled Appointment: 0  Progress Note Counter:     SUBJECTIVE:   Subjective  Subjective: Patient reports she was able to complete work shift without increased pain after last session. HEP Compliance:  [x] Good [] Fair [] Poor [] Reports not doing due to:    PAIN   Location:   Pain Location: Back  Pain Rating (0-10 pain scale):  Pain Level: 3  Pain Description:     Action:  [] Acceptable for treatment  []  Other:    OBJECTIVE:   Exercises  Exercise 2: wall slides 10 sec x10 flexion and abductoin  Exercise 3: posture ex x20  Exercise 4: pulleys x5 min  3 min flexion 2 min abduction  Exercise 5: rows and lats YTB x20  Exercise 6:  barrel stretch 3x30   Exercise 7: prone scap over ball 4 way x10  Exercise 20: HEP rail slides     Manual: []  NA   Manual therapy  Soft Tissue Mobalization: tennis ball massage to left periscapular musculature  Other: MFD cupping to left periscapular musculature to decrease pain. Including glides at medial scapula and 3 static cups for 4 minutes. Modalities: []  NA  Modalities  Moist heat: 15  min with IFC  E-stim (parameters): 15 min lt upper back. HEP  Continue with current Home Exercise Program.  See Objective section for progression of HEP. Comments:       POST-PAIN    Pain Rating (0-10 pain scale): denies  Location and Pain Description same as pre-pain unless otherwise indicated.   Action: [] NA  [] Call Physician  [] Perform HEP  [] Meds as prescribed     ASSESSMENT:     Conditions Requiring Skilled Therapeutic Intervention  Assessment: continued current POC with focus on left shoulder AROM and periscapular strength. Progressed reps and added prone scap with good tolerance. Patient requires cue for improved postural awareness. Denies pain post session. Tolerance to treatment:  [x] Good   [] Fair   [] Poor  [] Fatigued   [] Increased pain   Limited by:    Goals:     Short term goals  Time Frame for Short term goals: 4 weeks  Short term goal 1: Patient will report </= 1/10 pain in left upper back and scapula with work activities. Short term goal 2: Patient will be independent with HEP. Long term goals  Time Frame for Long term goals : 6 weeks  Long term goal 1: Patient will increase left shoulder ROM to [facility] for improved reach. Long term goal 2: Patient will increase strength in left shoulder and periscapulars >/= 1/2 manual muscle grade for improved tolerance. Long term goal 3: UEFI >/= 62/80 to demonstrate functional improvements. Progress toward goals:continue towards all   Goals Met:    []  See updated POC   Comments:    PLAN:  [x] Continue POC to pt tolerance  [] Hold PT for ___ days.   See note to physician  [] Discharge PT    Signature:   Electronically signed by Bobby Kurtz PTA on 3/11/21 at 1:07 PM EST    PT Individual Minutes  Time In: 7541  Time Out: 9054  Minutes: 50  Timed Code Treatment Minutes: 50 Minutes    Activity Minutes Units   Ther Ex 20 1   Manual  15 1   Estim 15 1

## 2021-03-15 ENCOUNTER — OFFICE VISIT (OUTPATIENT)
Dept: PRIMARY CARE CLINIC | Age: 21
End: 2021-03-15
Payer: COMMERCIAL

## 2021-03-15 VITALS
WEIGHT: 187 LBS | HEIGHT: 63 IN | DIASTOLIC BLOOD PRESSURE: 72 MMHG | RESPIRATION RATE: 18 BRPM | OXYGEN SATURATION: 100 % | SYSTOLIC BLOOD PRESSURE: 124 MMHG | BODY MASS INDEX: 33.13 KG/M2 | HEART RATE: 80 BPM

## 2021-03-15 DIAGNOSIS — F33.9 EPISODE OF RECURRENT MAJOR DEPRESSIVE DISORDER, UNSPECIFIED DEPRESSION EPISODE SEVERITY (HCC): ICD-10-CM

## 2021-03-15 DIAGNOSIS — M54.9 UPPER BACK PAIN ON LEFT SIDE: ICD-10-CM

## 2021-03-15 DIAGNOSIS — G43.019 INTRACTABLE MIGRAINE WITHOUT AURA AND WITHOUT STATUS MIGRAINOSUS: Primary | ICD-10-CM

## 2021-03-15 PROCEDURE — G8417 CALC BMI ABV UP PARAM F/U: HCPCS | Performed by: INTERNAL MEDICINE

## 2021-03-15 PROCEDURE — G8484 FLU IMMUNIZE NO ADMIN: HCPCS | Performed by: INTERNAL MEDICINE

## 2021-03-15 PROCEDURE — G8427 DOCREV CUR MEDS BY ELIG CLIN: HCPCS | Performed by: INTERNAL MEDICINE

## 2021-03-15 PROCEDURE — 99214 OFFICE O/P EST MOD 30 MIN: CPT | Performed by: INTERNAL MEDICINE

## 2021-03-15 PROCEDURE — 1036F TOBACCO NON-USER: CPT | Performed by: INTERNAL MEDICINE

## 2021-03-15 RX ORDER — PROPRANOLOL HCL 60 MG
60 CAPSULE, EXTENDED RELEASE 24HR ORAL DAILY
Qty: 30 CAPSULE | Refills: 3 | Status: SHIPPED | OUTPATIENT
Start: 2021-03-15 | End: 2021-07-12 | Stop reason: SDUPTHER

## 2021-03-15 RX ORDER — SUMATRIPTAN 50 MG/1
50 TABLET, FILM COATED ORAL
Qty: 9 TABLET | Refills: 1 | Status: SHIPPED | OUTPATIENT
Start: 2021-03-15 | End: 2021-06-02

## 2021-03-15 RX ORDER — CITALOPRAM 10 MG/1
10 TABLET ORAL DAILY
Qty: 30 TABLET | Refills: 3 | Status: SHIPPED | OUTPATIENT
Start: 2021-03-15 | End: 2021-06-02 | Stop reason: SDUPTHER

## 2021-03-15 ASSESSMENT — ENCOUNTER SYMPTOMS
NAUSEA: 1
DIARRHEA: 0
SHORTNESS OF BREATH: 0
WHEEZING: 0
COUGH: 0
ABDOMINAL PAIN: 0
VOMITING: 0

## 2021-03-15 NOTE — PROGRESS NOTES
Subjective:      Patient ID: William Holland is a 24 y.o. female    Follow up for back pain   Depression x 1 month  HPI  Pt presents for follow up regarding upper back pain management. Improved with PT sessions. Depression: x 1 month   Loss of interest: yes   Suicidal ideations: none  Energy level: low  Excessive guilt: yes  Poor concentration: yes   Appetite: normal   Psychomotor retardation: yes   Sleep: low   Irritability: yes   Euphoria: none   Distractibility: yes  Indiscretion: none   Grandiosity-inflated self esteem: none  Flight of ideas: yes   Accelerated activity: none    Anxiety/Worry: yes    Fatigue: yes   Restlessness: yes  Muscle tension: yes     Headche x 1 month  Headache is above the left eye, intermittent, achy, stabbing, throbbing, rated 9/10, nonradiating. No known aggravating or alleviating factors. No light or sound sensitivity. Assoc nausea, no vomiting. No flashes of light, no facial tingling or slurred speech. No help from ibuprofen and tylenol. NO head trauma. Past Medical History:   Diagnosis Date    Anxiety     Depression     [facility]     Pinched nerve     Type 2 diabetes mellitus without complication ([facility]      Past Surgical History:   Procedure Laterality Date    ARM SURGERY       Social History     Socioeconomic History    Marital status: Single     Spouse name: Not on file    Number of children: Not on file    Years of education: Not on file    Highest education level: Not on file   Occupational History    Not on file   Social Needs    Financial resource strain: Somewhat hard    Food insecurity     Worry: Never true     Inability: Never true    Transportation needs     Medical: No     Non-medical: No   Tobacco Use    Smoking status: Never Smoker    Smokeless tobacco: Never Used   Substance and Sexual Activity    Alcohol use:  Yes    Drug use: Never    Sexual activity: Not Currently   Lifestyle    Physical activity     Days per week: Not on file     Minutes per session: Not on file    Stress: Not on file   Relationships    Social connections     Talks on phone: Not on file     Gets together: Not on file     Attends [employer] service: Not on file     Active member of club or organization: Not on file     Attends meetings of clubs or organizations: Not on file     Relationship status: Not on file    Intimate partner violence     Fear of current or ex partner: Not on file     Emotionally abused: Not on file     Physically abused: Not on file     Forced sexual activity: Not on file   Other Topics Concern    Not on file   Social History Narrative    Not on file     Family History   Problem Relation Age of Onset    Thyroid Disease Other      Allergies:  Patient has no known allergies. There is no problem list on file for this patient. Current Outpatient Medications on File Prior to Visit   Medication Sig Dispense Refill    ketorolac (TORADOL) 10 MG tablet Take 1 tablet by mouth every 6 hours as needed for Pain 20 tablet 0    gabapentin (NEURONTIN) 100 MG capsule Take 1 capsule by mouth 3 times daily as needed (numbness, tingling) for up to 30 days. Intended supply: 90 days 90 capsule 1     No current facility-administered medications on file prior to visit. Review of Systems   Constitutional: Negative for chills, diaphoresis, fatigue and fever. HENT: Negative for congestion, ear discharge and ear pain. Respiratory: Negative for cough, shortness of breath and wheezing. Cardiovascular: Negative for chest pain and palpitations. Gastrointestinal: Positive for nausea. Negative for abdominal pain, diarrhea and vomiting. Endocrine: Negative for cold intolerance and heat intolerance. Genitourinary: Negative for dysuria, frequency and hematuria. Neurological: Positive for headaches. Negative for dizziness, speech difficulty, weakness and light-headedness. Psychiatric/Behavioral: Positive for dysphoric mood and sleep disturbance. Negative for suicidal ideas. The patient is nervous/anxious. Objective:   /72 (Site: Right Upper Arm, Position: Sitting, Cuff Size: Medium Adult)   Pulse 80   Resp 18   Ht 5' 3\" (1.6 m)   Wt 187 lb (84.8 kg)   LMP 02/26/2021   SpO2 100%   BMI 33.13 kg/m²     Physical Exam  Constitutional:       General: She is not in acute distress. Appearance: She is not diaphoretic. Cardiovascular:      Rate and Rhythm: Normal rate and regular rhythm. Pulses: Normal pulses. Heart sounds: Normal heart sounds, S1 normal and S2 normal. No murmur. Pulmonary:      Effort: Pulmonary effort is normal. No respiratory distress. Breath sounds: Normal breath sounds. No wheezing or rales. Chest:      Chest wall: No tenderness. Abdominal:      General: Bowel sounds are normal.      Tenderness: There is no abdominal tenderness. Neurological:      General: No focal deficit present. Mental Status: She is alert. Mental status is at baseline. Cranial Nerves: No cranial nerve deficit. Psychiatric:         Mood and Affect: Mood normal.         Thought Content: Thought content normal.       Assessment:       Diagnosis Orders   1. Intractable migraine without aura and without status migrainosus Inadequately Controlled SUMAtriptan (IMITREX) 50 MG tablet    propranolol (INDERAL LA) 60 MG extended release capsule   2. Episode of recurrent major depressive disorder, unspecified depression episode severity ([address] Inadequately Controlled Vianey Costello MD, [place]    citalopram (CELEXA) 10 MG tablet   3.  Upper back pain on left side Improving      Plan:      Orders Placed This Encounter   Procedures   Vianey Costello MD, [place]     Referral Priority:   Routine     Referral Type:   Eval and Treat     Referral Reason:   Specialty Services Required     Referred to Provider:   Wolfgang Coles MD     Requested Specialty:   Psychiatry     Number of Visits Requested:   1 Orders Placed This Encounter   Medications    SUMAtriptan (IMITREX) 50 MG tablet     Sig: Take 1 tablet by mouth once as needed for Migraine Take at the slightest hint of a headache     Dispense:  9 tablet     Refill:  1    propranolol (INDERAL LA) 60 MG extended release capsule     Sig: Take 1 capsule by mouth daily     Dispense:  30 capsule     Refill:  3    citalopram (CELEXA) 10 MG tablet     Sig: Take 1 tablet by mouth daily     Dispense:  30 tablet     Refill:  3     Return in about 2 weeks (around 3/29/2021) for to r/o sucidal ideation, assessment of response to treatment.

## 2021-03-16 ENCOUNTER — HOSPITAL ENCOUNTER (OUTPATIENT)
Dept: PHYSICAL THERAPY | Age: 21
Setting detail: THERAPIES SERIES
Discharge: HOME | End: 2021-03-16
Payer: COMMERCIAL

## 2021-03-16 PROCEDURE — 97110 THERAPEUTIC EXERCISES: CPT

## 2021-03-16 PROCEDURE — 97140 MANUAL THERAPY 1/> REGIONS: CPT

## 2021-03-16 PROCEDURE — G0283 ELEC STIM OTHER THAN WOUND: HCPCS

## 2021-03-16 ASSESSMENT — PAIN SCALES - GENERAL: PAINLEVEL_OUTOF10: 4

## 2021-03-16 ASSESSMENT — PAIN DESCRIPTION - ORIENTATION: ORIENTATION: LEFT

## 2021-03-16 ASSESSMENT — PAIN DESCRIPTION - DESCRIPTORS: DESCRIPTORS: ACHING

## 2021-03-18 ENCOUNTER — HOSPITAL ENCOUNTER (OUTPATIENT)
Dept: PHYSICAL THERAPY | Age: 21
Setting detail: THERAPIES SERIES
Discharge: HOME | End: 2021-03-18
Payer: COMMERCIAL

## 2021-03-18 PROCEDURE — 97110 THERAPEUTIC EXERCISES: CPT

## 2021-03-18 PROCEDURE — 97140 MANUAL THERAPY 1/> REGIONS: CPT

## 2021-03-18 PROCEDURE — G0283 ELEC STIM OTHER THAN WOUND: HCPCS

## 2021-03-18 NOTE — PROGRESS NOTES
[facility]   Outpatient Physical Therapy   Treatment Note  [] [address]  [x] [facility]            of [address]  Date: 3/18/2021  Patient: Gaynell Kanner  : 2000  ACCT #: [de-identified]  Referring Practitioner: Adilia Bergeron MD  Diagnosis: Upper back pain on left side    Visit Information:  PT Visit Information  PT Insurance Information: [employer]  Total # of Visits Approved: 30  Total # of Visits to Date: 7  No Show: 0  Canceled Appointment: 0  Progress Note Counter:     SUBJECTIVE:   Subjective  Subjective: reports pain after work last night 7/10. has been doing her HEP with no complaints. feels like her pain and AROM is getting better since beginning therapy. HEP Compliance:  [x] Good [] Fair [] Poor [] Reports not doing due to:    PAIN   Location:      Pain Rating (0-10 pain scale):  Pain Level: 4  Pain Description:     Action:  [x] Acceptable for treatment  []  Other:    OBJECTIVE:   Exercises  Exercise 3: posture ex x20- shoulder rolls, scap retract and hold, shoulder shrugs  Exercise 4: pulleys x5 min  3 min flexion 2 min abduction  Exercise 5: rows and lats RTB x15 with scap retract  Exercise 6:  barrel stretch 3x30   Exercise 7: prone scap over ball 4 way x10  Exercise 8: door way stretch 2 ways -3x30 sec  Exercise 9: chest pullls 3 way  YTB x10   Exercise 10: initiated standing triceps with 1 lb weight  x10  Exercise 11: cervical AROM 10 each-rotation,flexion,ext,SB  Exercise 12: seated levator scap stretch [id number] hold  Exercise 13: seated UT stretch [id number]  Exercise 14: SA punch x10  Exercise 20: HEP:chest pulls ,levator stretch, rows & lats    Manual: []  NA   Manual therapy  Soft Tissue Mobalization: self STM with tennis ball against wall-gentle OP and also rolling on ball up/down/side to side.  tennis ball massage to left periscapular musculature(reports of some increased pain following this, but also felt some relief)    Modalities: [] NA  Modalities  Moist heat: 10  min with IFC  E-stim (parameters): 10 min lt upper back. Other: reports not liking the KT tape as she feels like it did not make a difference for her. pt does like estim and HP       HEP  Continue with current Home Exercise Program.  See Objective section for progression of HEP. Comments:       POST-PAIN  2/10   Pain Rating (0-10 pain scale):   Location and Pain Description same as pre-pain unless otherwise indicated. Action: [] NA  [] Call Physician  [x] Perform HEP  [] Meds as prescribed     ASSESSMENT:     Conditions Requiring Skilled Therapeutic Intervention  Assessment: Pt demonstrated ability to perform strengthing thereex without an increase in pain but required frequent VC/TC for form and technique. Pt reported relief following STM with tennis ball. Tolerance to treatment:  [x] Good   [] Fair   [] Poor  [] Fatigued   [] Increased pain   Limited by:    Goals:     Short term goals  Time Frame for Short term goals: 4 weeks  Short term goal 1: Patient will report </= 1/10 pain in left upper back and scapula with work activities. Short term goal 2: Patient will be independent with HEP. Long term goals  Time Frame for Long term goals : 6 weeks  Long term goal 1: Patient will increase left shoulder ROM to [facility] for improved reach. Long term goal 2: Patient will increase strength in left shoulder and periscapulars >/= 1/2 manual muscle grade for improved tolerance. Long term goal 3: UEFI >/= 62/80 to demonstrate functional improvements. Progress toward goals: ROM and strength        PLAN:  [x] Continue POC to pt tolerance  [] Hold PT for ___ days.   See note to physician  [] Discharge PT    Signature:   Electronically signed by Rosalba Pierson PTA on 3/18/21 at 1:02 PM EDT    PT Individual Minutes  Time In: 1300  Time Out: 1400  Minutes: 60  Timed Code Treatment Minutes: 50 Minutes    Activity Minutes Units   Ther Ex 35 2   Manual   15  1   Estim 10 1

## 2021-03-23 ENCOUNTER — HOSPITAL ENCOUNTER (OUTPATIENT)
Dept: PHYSICAL THERAPY | Age: 21
Setting detail: THERAPIES SERIES
Discharge: HOME | End: 2021-03-23
Payer: COMMERCIAL

## 2021-03-23 PROCEDURE — 97140 MANUAL THERAPY 1/> REGIONS: CPT

## 2021-03-23 PROCEDURE — G0283 ELEC STIM OTHER THAN WOUND: HCPCS

## 2021-03-23 PROCEDURE — 97110 THERAPEUTIC EXERCISES: CPT

## 2021-03-23 ASSESSMENT — PAIN DESCRIPTION - DESCRIPTORS: DESCRIPTORS: ACHING

## 2021-03-23 ASSESSMENT — PAIN DESCRIPTION - LOCATION: LOCATION: BACK

## 2021-03-23 ASSESSMENT — PAIN DESCRIPTION - ORIENTATION: ORIENTATION: LEFT

## 2021-03-23 ASSESSMENT — PAIN SCALES - GENERAL: PAINLEVEL_OUTOF10: 6

## 2021-03-23 NOTE — PROGRESS NOTES
[facility]   Outpatient Physical Therapy   Treatment Note  [] [address]  [x] [facility]            of [address]  Date: 3/23/2021  Patient: Li Silva  : 2000  ACCT #: [de-identified]  Referring Practitioner: Sita Lorenzo MD  Diagnosis: Upper back pain on left side    Visit Information:  PT Visit Information  PT Insurance Information: [employer]  Total # of Visits Approved: 30  Total # of Visits to Date: 8  No Show: 0  Canceled Appointment: 0  Progress Note Counter:     SUBJECTIVE:   Subjective  Subjective: pt reports had a nerve pain in lt shoullder blade that saused arm to feel weak. HEP Compliance:  [x] Good [] Fair [] Poor [] Reports not doing due to:    PAIN   Location:   Pain Location: Back  Pain Rating (0-10 pain scale):  Pain Level: 6  Pain Description:  Pain Descriptors: Aching  Action:  [x] Acceptable for treatment  []  Other:    OBJECTIVE:   Exercises  Exercise 4: pulleys x5 min  3 min flexion 2 min abduction  Exercise 6:  barrel stretch 3x30   Exercise 9: chest pullls 3 way  YTB x10   Exercise 11: cervical AROM 10 each-rotation,flexion,ext,SB  Exercise 13: seated UT stretch [id number]  Exercise 20: HEp cont current     Manual: []  NA   Manual therapy  Soft Tissue Mobalization: sub occ release to decrease paraspinal  spasms , tennis ball to lt scap area  Other: cupping to lt scap area gliding     Modalities: []  NA  Modalities  Moist heat: 10  min with IFC  E-stim (parameters): 10 min lt upper back. HEP  Continue with current Home Exercise Program.  See Objective section for progression of HEP. Comments:       POST-PAIN    Pain Rating (0-10 pain scale): 6/10  Location and Pain Description same as pre-pain unless otherwise indicated.   Action: [] NA  [] Call Physician  [x] Perform HEP  [] Meds as prescribed     ASSESSMENT:     Conditions Requiring Skilled Therapeutic Intervention  Assessment: Pt with increase spasm noted in ut region, but decreased overall in scap paraspinals. Pt with good ricco to treatment. Tolerance to treatment:  [] Good   [x] Fair   [] Poor  [] Fatigued   [] Increased pain   Limited by:    Goals:     Short term goals  Time Frame for Short term goals: 4 weeks  Short term goal 1: Patient will report </= 1/10 pain in left upper back and scapula with work activities. Short term goal 2: Patient will be independent with HEP. Long term goals  Time Frame for Long term goals : 6 weeks  Long term goal 1: Patient will increase left shoulder ROM to [facility] for improved reach. Long term goal 2: Patient will increase strength in left shoulder and periscapulars >/= 1/2 manual muscle grade for improved tolerance. Long term goal 3: UEFI >/= 62/80 to demonstrate functional improvements. Progress toward goals: rom  Goals Met:    []  See updated POC   Comments:    PLAN:  [x] Continue POC to pt tolerance  [] Hold PT for ___ days.   See note to physician  [] Discharge PT    Signature:   Electronically signed by Lennie Diaz PTA on 3/23/21 at 4:37 PM EDT    PT Individual Minutes  Time In: 1330  Time Out: 5778  Minutes: 55  Timed Code Treatment Minutes: 45 Minutes    Activity Minutes Units   Ther Ex 30 2   Manual   15 1    Estim 10 1

## 2021-03-25 ENCOUNTER — HOSPITAL ENCOUNTER (OUTPATIENT)
Dept: PHYSICAL THERAPY | Age: 21
Setting detail: THERAPIES SERIES
Discharge: HOME | End: 2021-03-25
Payer: COMMERCIAL

## 2021-03-25 PROCEDURE — G0283 ELEC STIM OTHER THAN WOUND: HCPCS

## 2021-03-25 PROCEDURE — 97110 THERAPEUTIC EXERCISES: CPT

## 2021-03-25 PROCEDURE — 97140 MANUAL THERAPY 1/> REGIONS: CPT

## 2021-03-25 ASSESSMENT — PAIN SCALES - GENERAL: PAINLEVEL_OUTOF10: 5

## 2021-03-25 NOTE — PROGRESS NOTES
[facility]   Outpatient Physical Therapy   Treatment Note  [] [address]  [x] [facility]            of [address]  Date: 3/25/2021   Patient: Brie Busch  : 2000  ACCT #: [de-identified]  Referring Practitioner: Getachew Lemons MD  Diagnosis: Upper back pain on left side    Visit Information:  PT Visit Information  PT Insurance Information: [employer]  Total # of Visits Approved: 30  Total # of Visits to Date: 9  No Show: 0  Canceled Appointment: 0  Progress Note Counter:     SUBJECTIVE:   Subjective  Subjective: im still having a lot of pain in my L trap area. nothing seems to help it  HEP Compliance:  [x] Good [] Fair [] Poor [] Reports not doing due to:    PAIN   Location:      Pain Rating (0-10 pain scale):  Pain Level: 5  Pain Description:     Action:  [x] Acceptable for treatment  []  Other:    OBJECTIVE:   Exercises  Exercise 3: posture ex x20- shoulder rolls, scap retract and hold, shoulder shrugs  Exercise 4: pulleys x5 min  3 min flexion 2 min abduction  Exercise 5: rows and lats RTB x20 with scap retract  Exercise 8: door way stretch 2 ways -3x30 sec  Exercise 9: chest pullls 3 way  YTB x15(increased pain L UT)  Exercise 11: cervical AROM 10 each-rotation,flexion,ext,SB  Exercise 12: seated levator scap stretch [id number] hold  Exercise 13: seated UT stretch [id number]  Exercise 15: SCI fit- 5 mins. 2.50 fwd/2.50 bwd    Manual: []  NA   Manual therapy  Soft Tissue Mobalization: STM and trigger point release to L UT, gentle UT stretching    Modalities: []  NA  Modalities  Moist heat: 10  min with IFC  E-stim (parameters): 10 min to L UT and medial scap area              HEP  Continue with current Home Exercise Program.  See Objective section for progression of HEP. Comments:       POST-PAIN    Pain Rating (0-10 pain scale):   Location and Pain Description same as pre-pain unless otherwise indicated.   Action: [] NA  [] Call Physician  [x] Perform HEP [] Meds as prescribed     ASSESSMENT:     Conditions Requiring Skilled Therapeutic Intervention  Assessment: Pt with increased c/o pain in L UT this session which got worse following gentle stretching and thereex. Focus of treatment was on manual therapy and pain relief interventions. Tolerance to treatment:  [x] Good   [] Fair   [] Poor  [] Fatigued   [] Increased pain   Limited by:    Goals:     Short term goals  Time Frame for Short term goals: 4 weeks  Short term goal 1: Patient will report </= 1/10 pain in left upper back and scapula with work activities. Short term goal 2: Patient will be independent with HEP. Long term goals  Time Frame for Long term goals : 6 weeks  Long term goal 1: Patient will increase left shoulder ROM to [facility] for improved reach. Long term goal 2: Patient will increase strength in left shoulder and periscapulars >/= 1/2 manual muscle grade for improved tolerance. Long term goal 3: UEFI >/= 62/80 to demonstrate functional improvements. Progress toward goals: stretching     PLAN:  [x] Continue POC to pt tolerance  [] Hold PT for ___ days.   See note to physician  [] Discharge PT    Signature:   Electronically signed by Duglas Chatman PTA on 3/25/21 at 1:01 PM EDT    PT Individual Minutes  Time In: 1300  Time Out: 1400  Minutes: 60  Timed Code Treatment Minutes: 50 Minutes    Activity Minutes Units   Ther Ex 20 1   Manual   30  2   Estim 10 1

## 2021-03-30 ENCOUNTER — HOSPITAL ENCOUNTER (OUTPATIENT)
Dept: PHYSICAL THERAPY | Age: 21
Setting detail: THERAPIES SERIES
Discharge: HOME | End: 2021-03-30
Payer: COMMERCIAL

## 2021-03-30 PROCEDURE — 97110 THERAPEUTIC EXERCISES: CPT

## 2021-03-30 PROCEDURE — G0283 ELEC STIM OTHER THAN WOUND: HCPCS

## 2021-03-30 PROCEDURE — 97140 MANUAL THERAPY 1/> REGIONS: CPT

## 2021-03-30 ASSESSMENT — PAIN DESCRIPTION - DESCRIPTORS: DESCRIPTORS: ACHING

## 2021-03-30 NOTE — PROGRESS NOTES
[facility]   Outpatient Physical Therapy   Treatment Note  [] [address]  [x] [facility]  Date: 3/30/2021  Patient: Russell Duffy  : 2000  ACCT #: [de-identified]  Referring Practitioner: Purnima Stack MD  Diagnosis: Upper back pain on left side    Visit Information:  PT Visit Information  [facility]  Total # of Visits Approved: 30  Total # of Visits to Date: 10  No Show: 0  Canceled Appointment: 0  Progress Note Counter: 10/12    SUBJECTIVE:   Subjective  Subjective: Patient reports decreased pain and UT tightness since last visit. HEP Compliance:  [x] Good [] Fair [] Poor [] Reports not doing due to:    PAIN   Location:   Pain Location: Neck  Pain Rating (0-10 pain scale):  Pain Level: 3  Pain Description:  Pain Descriptors: Aching  Action:  [] Acceptable for treatment  []  Other:    OBJECTIVE:   Exercises  Exercise 4: pulleys x5 min  3 min flexion 2 min abduction  Exercise 5: rows and lats RTB x20 with scap retract  Exercise 8: door way stretch 2 ways -3x30 sec  Exercise 9: chest pullls 3 way  YTB x15  Exercise 11: cervical AROM 15  each-rotation,flexion,ext,SB  Exercise 12: seated levator scap stretch [id number] hold  Exercise 13: seated UT stretch [id number]  Exercise 15: SCI fit- 5 mins. 2.50 fwd/2.50 bwd    Manual: []  NA   Manual therapy  Soft Tissue Mobalization: STM and trigger point release to L UT  Other: MFD cupping to left UT including glides and 3 static cups to decrease pain/tightness    Modalities: []  NA  Modalities  Moist heat: 10  min with IFC  E-stim (parameters): 10 min to L UT and medial scap area   HEP  Continue with current Home Exercise Program.  See Objective section for progression of HEP. Comments:       POST-PAIN    Pain Rating (0-10 pain scale):   Location and Pain Description same as pre-pain unless otherwise indicated.   Action: [] NA  [] Call Physician  [] Perform HEP  [] Meds as prescribed     ASSESSMENT:     Conditions Requiring Skilled Therapeutic Intervention  Assessment: continued current POC with focus on cervical/thoracic mobility and scapular strength. Patient continues to demonstrate decreased postural awareness in seated position. Denies increased pain/muscle fatigue during strengthening exercises. Reports relief post manual/modalities. Tolerance to treatment:  [x] Good   [] Fair   [] Poor  [] Fatigued   [] Increased pain   Limited by:    Goals:  Short term goals  Time Frame for Short term goals: 4 weeks  Short term goal 1: Patient will report </= 1/10 pain in left upper back and scapula with work activities. Short term goal 2: Patient will be independent with HEP. Long term goals  Time Frame for Long term goals : 6 weeks  Long term goal 1: Patient will increase left shoulder ROM to [facility] for improved reach. Long term goal 2: Patient will increase strength in left shoulder and periscapulars >/= 1/2 manual muscle grade for improved tolerance. Long term goal 3: UEFI >/= 62/80 to demonstrate functional improvements. Progress toward goals:continue towards all   Goals Met:    [x]  See updated POC   Comments:    PLAN:  [x] Continue POC to pt tolerance  [] Hold PT for ___ days.   See note to physician  [] Discharge PT    Signature:   Electronically signed by Nick Cam PTA on 3/30/21 at 2:29 PM EDT    PT Individual Minutes  Time In: 1100  Time Out: 4118  Minutes: 65  Timed Code Treatment Minutes: 65 Minutes    Activity Minutes Units   Ther Ex 40 2   Manual  15 1   Estim 10 1

## 2021-04-01 ENCOUNTER — HOSPITAL ENCOUNTER (OUTPATIENT)
Dept: PHYSICAL THERAPY | Age: 21
Setting detail: THERAPIES SERIES
Discharge: HOME | End: 2021-04-01
Payer: COMMERCIAL

## 2021-04-01 PROCEDURE — 97110 THERAPEUTIC EXERCISES: CPT

## 2021-04-01 PROCEDURE — G0283 ELEC STIM OTHER THAN WOUND: HCPCS

## 2021-04-01 PROCEDURE — 97140 MANUAL THERAPY 1/> REGIONS: CPT

## 2021-04-01 ASSESSMENT — PAIN SCALES - GENERAL: PAINLEVEL_OUTOF10: 0

## 2021-04-01 NOTE — PROGRESS NOTES
[facility]   Outpatient Physical Therapy   Treatment Note  [] [address]  [x] [facility]            of [address]  Date: 2021  Patient: Vitor Porras  : 2000  ACCT #: [de-identified]  Referring Practitioner: Julio Rolon MD  Diagnosis: Upper back pain on left side    Visit Information:  PT Visit Information  PT Insurance Information: [employer]  Total # of Visits Approved: 30  Total # of Visits to Date: 6  No Show: 0  Canceled Appointment: 0  Progress Note Counter:     SUBJECTIVE:   Subjective  Subjective: pt reports increased pain to 5/10 last night at work. HEP Compliance:  [x] Good [] Fair [] Poor [] Reports not doing due to:    PAIN   Location:      Pain Rating (0-10 pain scale):  Pain Level: 0  Pain Description:     Action:  [] Acceptable for treatment  []  Other:    OBJECTIVE:   Exercises  Exercise 5: rows and lats RTB x20 with scap retract  Exercise 6: dynamic stab 4 way with blue ball   Exercise 7: prone scap 4 way with 2# wt   Exercise 9: chest pullls 3 way  YTB x15  Exercise 12: seated levator scap stretch [id number] hold  Exercise 13: seated UT stretch [id number]  Exercise 15: SCI fit- 6 mins. 3 fwd/3bwd  Exercise 20: HEP cont current     Manual: []  NA   Manual therapy  Other: MFD cuppin to scap region     Modalities: []  NA  Modalities  Moist heat: 10  min with IFC  E-stim (parameters): 10 min to L UT and medial scap area       HEP  Continue with current Home Exercise Program.  See Objective section for progression of HEP. Comments:       POST-PAIN    Pain Rating (0-10 pain scale): 0/10  Location and Pain Description same as pre-pain unless otherwise indicated. Action: [] NA  [] Call Physician  [x] Perform HEP  [] Meds as prescribed     ASSESSMENT:     Conditions Requiring Skilled Therapeutic Intervention  Assessment: Focused therapy on increasing scap strength. Pt with some incresed tightness with prone scap exercsises. Tolerance to treatment:  [x] Good   [] Fair   [] Poor  [] Fatigued   [] Increased pain   Limited by:    Goals:     Short term goals  Time Frame for Short term goals: 4 weeks  Short term goal 1: Patient will report </= 1/10 pain in left upper back and scapula with work activities. Short term goal 2: Patient will be independent with HEP. Long term goals  Time Frame for Long term goals : 6 weeks  Long term goal 1: Patient will increase left shoulder ROM to [facility] for improved reach. Long term goal 2: Patient will increase strength in left shoulder and periscapulars >/= 1/2 manual muscle grade for improved tolerance. Long term goal 3: UEFI >/= 62/80 to demonstrate functional improvements. Progress toward goals: pain  Goals Met:    []  See updated POC   Comments:    PLAN:  [x] Continue POC to pt tolerance  [] Hold PT for ___ days.   See note to physician  [] Discharge PT    Signature:   Electronically signed by Agueda Monahan PTA on 4/1/21 at 1:52 PM EDT    PT Individual Minutes  Time In: 3859  Time Out: 7225  Minutes: 53  Timed Code Treatment Minutes: 43 Minutes    Activity Minutes Units   Ther Ex 35 2   Manual   8  1   Estim 10 1

## 2021-04-06 ENCOUNTER — HOSPITAL ENCOUNTER (OUTPATIENT)
Dept: PHYSICAL THERAPY | Age: 21
Setting detail: THERAPIES SERIES
Discharge: HOME | End: 2021-04-06
Payer: COMMERCIAL

## 2021-04-06 PROCEDURE — 97110 THERAPEUTIC EXERCISES: CPT

## 2021-04-06 PROCEDURE — G0283 ELEC STIM OTHER THAN WOUND: HCPCS

## 2021-04-06 PROCEDURE — 97140 MANUAL THERAPY 1/> REGIONS: CPT

## 2021-04-06 ASSESSMENT — PAIN DESCRIPTION - ORIENTATION: ORIENTATION: LEFT

## 2021-04-06 ASSESSMENT — PAIN SCALES - GENERAL: PAINLEVEL_OUTOF10: 2

## 2021-04-06 NOTE — PROGRESS NOTES
[address]   [address] [place], [place]  Phone:  [phone]   Fax:  [phone]    [] Certification  [] Recertification [x]  Plan of Care  [] Progress Note   [] Discharge        To: Katina Cobb MD  From:  Jennifer Cook PT  Patient: Angie Irwin     : 2000  Diagnosis: Upper back pain on left side     Date: 2021  Treatment Diagnosis: decreased UE strength, decreased left shoulder ROM, left scapula pain       Progress Report Period from: 21   to 2021    Total # of Visits to Date: 12   No Show: 0    Canceled Appointment: 0     OBJECTIVE:   Short Term Goals - Time Frame for Short term goals: 4 weeks    Goals Current/Discharge status  Met   Short term goal 1: Patient will report </= 1/10 pain in left upper back and scapula with work activities. Pain ranges from 2--5 increased with activity [] yes  [x] no   Short term goal 2: Patient will be independent with HEP. Patient independent with HEP. [x] yes  [] no     Long Term Goals - Time Frame for Long term goals : 6 weeks  Goals Current/ Discharge status Met   Long term goal 1: Patient will increase left shoulder ROM to [facility] for improved reach. AROM LUE (degrees)  L Shoulder Flexion 0-180: 150  L Shoulder Extension 0-45: 45  L Shoulder ABduction 0-180: 115  L Shoulder Int Rotation  0-70: 80  L Shoulder Ext Rotation  0-90: 70    [] yes  [x] no   Long term goal 2: Patient will increase strength in left shoulder and periscapulars >/= 1/2 manual muscle grade for improved tolerance. Strength LUE  Comment: rhomboid/middle trap 4-  L Shoulder Flexion: 4+/5  L Shoulder Extension: 4/5  L Shoulder ABduction: 4+/5  L Shoulder Internal Rotation: 4/5  L Shoulder External Rotation: 4/5  L Elbow Flexion: 4+/5  L Elbow Extension: 4+/5  L Wrist Flexion: 4+/5  L Wrist Extension: 4/5    [x] yes  [x] no   Long term goal 3: UEFI >/= 62/80 to demonstrate functional improvements.  UEFI 56/80 [] yes  [x] no     Body structures, Functions, Activity limitations: Decreased ROM, Decreased strength, Increased pain, Decreased sensation, Decreased ADL status  Assessment: Patient demonstrates improvements since coming to PT. Patient would benefit from further strengthening for improved work tolerance. Prognosis: Good  Discharge Recommendations: Continue to assess pending progress  New Education Provided:      PLAN: [x] Evaluate and Treat  Frequency/Duration:  Plan  Times per week: 2  Plan weeks: 3  Current Treatment Recommendations: Strengthening, ROM, Endurance Training, Neuromuscular Re-education, Manual Therapy - Soft Tissue Mobilization, Manual Therapy - Joint Manipulation, Home Exercise Program, Modalities, Patient/Caregiver Education & Training     Precautions:             Patient Status:[x] Continue/ Initate plan of Care    [] Discharge PT. Recommend pt continue with HEP. [] Additional visits requested, Please re-certify for additional visits:        Signature: objective information by Electronically signed by Raymond Martino PTA on 4/6/21 at 3:11 PM EDT   Electronically signed by Rosalba Albercht PT on 4/8/2021 at 8:01 AM        If you have any questions or concerns, please don't hesitate to call. Thank you for your referral.    I have reviewed this plan of care and certify a need for medically necessary rehabilitation services.     Physician Signature:__________________________________________________________  Date:  Please sign and return

## 2021-04-06 NOTE — PROGRESS NOTES
Rotation  0-90: 70       HEP  Continue with current Home Exercise Program.  See Objective section for progression of HEP. Comments:       POST-PAIN    Pain Rating (0-10 pain scale): 0/10  Location and Pain Description same as pre-pain unless otherwise indicated. Action: [] NA  [] Call Physician  [x] Perform HEP  [] Meds as prescribed     ASSESSMENT:     Conditions Requiring Skilled Therapeutic Intervention  Assessment: pt reports that pain 2-5 /10  with cont tightness/  Pt reports feels about 50%bettter. Pt  with cont fatigue with cane exercises and tg prone scap exercises. Exam: uefi 56/80        Tolerance to treatment:  [x] Good   [] Fair   [] Poor  [] Fatigued   [] Increased pain   Limited by:    Goals:     Short term goals  Time Frame for Short term goals: 4 weeks  Short term goal 1: Patient will report </= 1/10 pain in left upper back and scapula with work activities. Short term goal 2: Patient will be independent with HEP. Long term goals  Time Frame for Long term goals : 6 weeks  Long term goal 1: Patient will increase left shoulder ROM to [facility] for improved reach. Long term goal 2: Patient will increase strength in left shoulder and periscapulars >/= 1/2 manual muscle grade for improved tolerance. Long term goal 3: UEFI >/= 62/80 to demonstrate functional improvements. Progress toward goals: pain  Goals Met:    [x]  See updated POC   Comments:    PLAN:  [x] Continue POC to pt tolerance  [] Hold PT for ___ days.   See note to physician  [] Discharge PT    Signature:   Electronically signed by Derick Prieto PTA on 4/6/21 at 2:56 PM EDT    PT Individual Minutes  Time In: 1300  Time Out: 2122  Minutes: 63  Timed Code Treatment Minutes: 53 Minutes    Activity Minutes Units   Ther Ex 40 3   Manual  13  1   Estim 10 1

## 2021-04-08 ENCOUNTER — HOSPITAL ENCOUNTER (OUTPATIENT)
Dept: PHYSICAL THERAPY | Age: 21
Setting detail: THERAPIES SERIES
Discharge: HOME | End: 2021-04-08
Payer: COMMERCIAL

## 2021-04-08 PROCEDURE — G0283 ELEC STIM OTHER THAN WOUND: HCPCS

## 2021-04-08 PROCEDURE — 97140 MANUAL THERAPY 1/> REGIONS: CPT

## 2021-04-08 PROCEDURE — 97110 THERAPEUTIC EXERCISES: CPT

## 2021-04-08 ASSESSMENT — PAIN DESCRIPTION - ORIENTATION: ORIENTATION: LEFT

## 2021-04-08 ASSESSMENT — PAIN SCALES - GENERAL: PAINLEVEL_OUTOF10: 2

## 2021-04-08 ASSESSMENT — PAIN DESCRIPTION - DESCRIPTORS: DESCRIPTORS: ACHING

## 2021-04-08 NOTE — PROGRESS NOTES
[facility]   Outpatient Physical Therapy   Treatment Note  [] [address]  [x] [facility]            of [address]  Date: 2021  Patient: Dante Escobar  : 2000  ACCT #: [de-identified]  Referring Practitioner: Roverto Dueñas MD  Diagnosis: Upper back pain on left side    Visit Information:  PT Visit Information  PT Insurance Information: [employer]  Total # of Visits Approved: 30  Total # of Visits to Date: 15  No Show: 0  Canceled Appointment: 0  Progress Note Counter:     SUBJECTIVE:   Subjective  Subjective: Pt reports more of a stiffness in scap area   HEP Compliance:  [x] Good [] Fair [] Poor [] Reports not doing due to:    PAIN   Location:     shoulder area  Pain Rating (0-10 pain scale):  Pain Level: 2  Pain Description:  Pain Descriptors: Aching  Action:  [] Acceptable for treatment  []  Other:    OBJECTIVE:   Exercises  Exercise 1: wand ex flexion, abduction,extension,  ER 5s x 5  Exercise 5: rows and lats RTB x20 with scap retract  Exercise 7: prone scap 4 way with 2# wt   Exercise 9: chest pullls 3 way  YTB x15 x10 with left hand up   Exercise 10: initiated er/ir RTb x10   Exercise 11: dynamic stab 3 way x10   Exercise 15: SCI fit- l2 3 min retro     Manual: []  NA   Manual therapy  PROM: left shoulder PROM   Other: MFD cuppin to scap region     Modalities: []  NA  Modalities  Moist heat: 10  min with IFC  E-stim (parameters): 10 min lt scap        HEP  Continue with current Home Exercise Program.  See Objective section for progression of HEP. Comments:       POST-PAIN    Pain Rating (0-10 pain scale): 0/10  Location and Pain Description same as pre-pain unless otherwise indicated. Action: [] NA  [] Call Physician  [x] Perform HEP  [] Meds as prescribed     ASSESSMENT:     Conditions Requiring Skilled Therapeutic Intervention  Assessment: Pt reports that stiffness in scap area. Pt with improving ROM.          Tolerance to treatment:  [x] Good [] Fair   [] Poor  [] Fatigued   [] Increased pain   Limited by:    Goals:     Short term goals  Time Frame for Short term goals: 4 weeks  Short term goal 1: Patient will report </= 1/10 pain in left upper back and scapula with work activities. Short term goal 2: Patient will be independent with HEP. Long term goals  Time Frame for Long term goals : 6 weeks  Long term goal 1: Patient will increase left shoulder ROM to [facility] for improved reach. Long term goal 2: Patient will increase strength in left shoulder and periscapulars >/= 1/2 manual muscle grade for improved tolerance. Long term goal 3: UEFI >/= 62/80 to demonstrate functional improvements. Progress toward goals: pain  Goals Met:    []  See updated POC   Comments:    PLAN:  [x] Continue POC to pt tolerance  [] Hold PT for ___ days.   See note to physician  [] Discharge PT    Signature:   Electronically signed by Joaquin Chew PTA on 4/8/21 at 12:42 PM EDT    PT Individual Minutes  Time In: 3736  Time Out: 5665  Minutes: 54  Timed Code Treatment Minutes: 44 Minutes    Activity Minutes Units   Ther Ex 34 2   Manual   10  1   Estim 10 1

## 2021-04-13 ENCOUNTER — HOSPITAL ENCOUNTER (OUTPATIENT)
Dept: PHYSICAL THERAPY | Age: 21
Setting detail: THERAPIES SERIES
Discharge: HOME | End: 2021-04-13
Payer: COMMERCIAL

## 2021-04-13 PROCEDURE — 97110 THERAPEUTIC EXERCISES: CPT

## 2021-04-13 PROCEDURE — G0283 ELEC STIM OTHER THAN WOUND: HCPCS

## 2021-04-13 PROCEDURE — 97140 MANUAL THERAPY 1/> REGIONS: CPT

## 2021-04-13 NOTE — PROGRESS NOTES
[facility]   Outpatient Physical Therapy   Treatment Note  [] [address]  [x] [facility]  Date: 2021  Patient: Terry Waldrop  : 2000  ACCT #: [de-identified]  Referring Practitioner: Artemio Sim MD  Diagnosis: Upper back pain on left side    Visit Information:  PT Visit Information  [facility]  Total # of Visits Approved: 30  Total # of Visits to Date: 14  No Show: 0  Canceled Appointment: 0  Progress Note Counter:     SUBJECTIVE:   Subjective  Subjective: pt reports just tightness in scap area  HEP Compliance:  [x] Good [] Fair [] Poor [] Reports not doing due to:    PAIN   Location:     scap  Pain Rating (0-10 pain scale):  Pain Level: 2  Pain Description:  Pain Descriptors: Tightness  Action:  [] Acceptable for treatment  []  Other:    OBJECTIVE:   Exercises  Exercise 2: Ytb flexion x10 abduction x7   Exercise 3: wall push ups x10   Exercise 5: rows and lats RTB x20 with scap retract  Exercise 7: prone scap 4 way with 2# wt   Exercise 9: chest pullls 3 way  YTB x15 x12 with left hand up   Exercise 10: er/ir RTb x10   Exercise 15: SCI fit- l3 3 min retro 3 min foward   Exercise 20: HEp YTB shoulder flexion and abduction to 90    Manual: []  NA   Manual therapy  Soft Tissue Mobalization: stm trigger point to scaps  Other: MFD cupping to scap region, paraspinals    Modalities: []  NA  Modalities  Moist heat: 15  min with IFC  E-stim (parameters): 15 min lt scap        HEP  Continue with current Home Exercise Program.  See Objective section for progression of HEP. Comments:       POST-PAIN    Pain Rating (0-10 pain scale): 0/10  Location and Pain Description same as pre-pain unless otherwise indicated.   Action: [] NA  [] Call Physician  [x] Perform HEP  [] Meds as prescribed     ASSESSMENT:     Conditions Requiring Skilled Therapeutic Intervention  Assessment: pt with increased pain in shoulder with ER/IR and tband shoulder due to weakness. Tolerance to treatment:  [x] Good   [] Fair   [] Poor  [] Fatigued   [] Increased pain   Limited by:    Goals:     Short term goals  Time Frame for Short term goals: 4 weeks  Short term goal 1: Patient will report </= 1/10 pain in left upper back and scapula with work activities. Short term goal 2: Patient will be independent with HEP. Long term goals  Time Frame for Long term goals : 6 weeks  Long term goal 1: Patient will increase left shoulder ROM to [facility] for improved reach. Long term goal 2: Patient will increase strength in left shoulder and periscapulars >/= 1/2 manual muscle grade for improved tolerance. Long term goal 3: UEFI >/= 62/80 to demonstrate functional improvements. Progress toward goals: rom  Goals Met:    []  See updated POC   Comments:    PLAN:  [x] Continue POC to pt tolerance  [] Hold PT for ___ days.   See note to physician  [] Discharge PT    Signature:   Electronically signed by Arun Tucker PTA on 4/13/21 at 12:14 PM EDT    PT Individual Minutes  Time In: 7576  Time Out: 1200  Minutes: 55  Timed Code Treatment Minutes: 40 Minutes    Activity Minutes Units   Ther Ex 30 2   Manual   10  1   Estim 15 1

## 2021-04-15 ENCOUNTER — HOSPITAL ENCOUNTER (OUTPATIENT)
Dept: PHYSICAL THERAPY | Age: 21
Setting detail: THERAPIES SERIES
Discharge: HOME | End: 2021-04-15
Payer: COMMERCIAL

## 2021-04-15 PROCEDURE — 97110 THERAPEUTIC EXERCISES: CPT

## 2021-04-15 PROCEDURE — 97140 MANUAL THERAPY 1/> REGIONS: CPT

## 2021-04-15 PROCEDURE — G0283 ELEC STIM OTHER THAN WOUND: HCPCS

## 2021-04-15 ASSESSMENT — PAIN SCALES - GENERAL: PAINLEVEL_OUTOF10: 3

## 2021-04-15 NOTE — PROGRESS NOTES
[place]   Outpatient Physical Therapy   Treatment Note  [] [address]  [x] [facility]  Date: 4/15/2021  Patient: Moira Naqvi  : 2000  ACCT #: [de-identified]  Referring Practitioner: Latoya Iqbal MD  Diagnosis: Upper back pain on left side    Visit Information:  PT Visit Information  PT Insurance Information: [employer]  Total # of Visits Approved: 30  Total # of Visits to Date: 15  No Show: 0  Canceled Appointment: 0  Progress Note Counter: 3/6    SUBJECTIVE:   Subjective  Subjective: pt reports a little more sore today after work yesterday. HEP Compliance:  [x] Good [] Fair [] Poor [] Reports not doing due to:    PAIN   Location:     scap  Pain Rating (0-10 pain scale):  Pain Level: 3  Pain Description:  Pain Descriptors: Aching  Action:  [] Acceptable for treatment  []  Other:    OBJECTIVE:   Exercises  Exercise 1: wand ex flexion, abduction,extension,  ER 5s x 10  Exercise 3: wall push ups x10   Exercise 5: rows and lats RTB x20 with scap retract  Exercise 7: prone scap 4 way with 3# wt   Exercise 10: er/ir RTb x10   Exercise 11: dynamic stab 3 way x10   Exercise 13: body blade 2 way   Exercise 15: SCI fit- l3.5 3 min retro 3 min foward   Exercise 20: HEp cont current     Manual: []  NA   Manual therapy  Soft Tissue Mobalization: tennis ball massage lt scap muscles  Other: MFD cupping to scap region, paraspinals    Modalities: []  NA  Modalities  Moist heat: 10 min with IFC  E-stim (parameters): 10 with MH to lt scap        HEP  Continue with current Home Exercise Program.  See Objective section for progression of HEP. Comments:       POST-PAIN    Pain Rating (0-10 pain scale): 0/10  Location and Pain Description same as pre-pain unless otherwise indicated.   Action: [] NA  [] Call Physician  [x] Perform HEP  [] Meds as prescribed     ASSESSMENT:     Conditions Requiring Skilled Therapeutic Intervention  Assessment: Pt with good ricco to increased wt and resistance. Pt with some cont difficulty with strengthing overhead. Tolerance to treatment:  [x] Good   [] Fair   [] Poor  [] Fatigued   [] Increased pain   Limited by:    Goals:     Short term goals  Time Frame for Short term goals: 4 weeks  Short term goal 1: Patient will report </= 1/10 pain in left upper back and scapula with work activities. Short term goal 2: Patient will be independent with HEP. Long term goals  Time Frame for Long term goals : 6 weeks  Long term goal 1: Patient will increase left shoulder ROM to [facility] for improved reach. Long term goal 2: Patient will increase strength in left shoulder and periscapulars >/= 1/2 manual muscle grade for improved tolerance. Long term goal 3: UEFI >/= 62/80 to demonstrate functional improvements. Progress toward goals:pain  Goals Met:    []  See updated POC   Comments:    PLAN:  [x] Continue POC to pt tolerance  [] Hold PT for ___ days.   See note to physician  [] Discharge PT    Signature:   Electronically signed by Brandt Barrett PTA on 4/15/21 at 1:29 PM EDT    PT Individual Minutes  Time In: 0777  Time Out: 1101  Minutes: 56  Timed Code Treatment Minutes: 46 Minutes    Activity Minutes Units   Ther Ex 35 2   Manual   11  1   Estim 10 1

## 2021-04-17 ENCOUNTER — PATIENT MESSAGE (OUTPATIENT)
Dept: OBGYN CLINIC | Age: 21
End: 2021-04-17

## 2021-04-19 RX ORDER — NORGESTIMATE AND ETHINYL ESTRADIOL 0.25-0.035
1 KIT ORAL DAILY
Qty: 1 PACKET | Refills: 3 | Status: SHIPPED | OUTPATIENT
Start: 2021-04-19 | End: 2021-10-04

## 2021-04-19 RX ORDER — NORGESTIMATE AND ETHINYL ESTRADIOL 0.25-0.035
1 KIT ORAL DAILY
Qty: 1 PACKET | Refills: 3 | Status: CANCELLED | OUTPATIENT
Start: 2021-04-19

## 2021-04-19 NOTE — TELEPHONE ENCOUNTER
From: Lakisha Mccain  To: Cathy Valle MD  Sent: 4/17/2021 5:37 PM EDT  Subject: Prescription Question    Did the office ever send in my birth control prescription? ??

## 2021-04-20 ENCOUNTER — HOSPITAL ENCOUNTER (OUTPATIENT)
Dept: PHYSICAL THERAPY | Age: 21
Setting detail: THERAPIES SERIES
Discharge: HOME | End: 2021-04-20
Payer: COMMERCIAL

## 2021-04-20 PROCEDURE — 97110 THERAPEUTIC EXERCISES: CPT

## 2021-04-20 PROCEDURE — G0283 ELEC STIM OTHER THAN WOUND: HCPCS

## 2021-04-20 PROCEDURE — 97140 MANUAL THERAPY 1/> REGIONS: CPT

## 2021-04-20 ASSESSMENT — PAIN SCALES - GENERAL: PAINLEVEL_OUTOF10: 1

## 2021-04-20 ASSESSMENT — PAIN DESCRIPTION - DESCRIPTORS: DESCRIPTORS: TIGHTNESS

## 2021-04-20 NOTE — PROGRESS NOTES
[facility]   Outpatient Physical Therapy   Treatment Note  [] [address]  [x] [facility]            of [address]  Date: 2021  Patient: David Beckham  : 2000  ACCT #: [de-identified]  Referring Practitioner: Jonelle Ball MD  Diagnosis: Upper back pain on left side    Visit Information:  PT Visit Information  PT Insurance Information: [employer]  Total # of Visits Approved: 30  Total # of Visits to Date: 12  No Show: 0  Canceled Appointment: 0  Progress Note Counter:     SUBJECTIVE:   Subjective  Subjective: pt reports mostly just stiffiness   HEP Compliance:  [x] Good [] Fair [] Poor [] Reports not doing due to:    PAIN   Location:     scap  Pain Rating (0-10 pain scale):  Pain Level: 1  Pain Description:  Pain Descriptors: Tightness  Action:  [] Acceptable for treatment  []  Other:    OBJECTIVE:   Exercises  Exercise 1: wand standing extension  Exercise 2: bounce ball off wall overhead x15   Exercise 3: wall push ups x10   Exercise 7: prone scap 4 way with 3# wt x15  Exercise 9: chest pullls 3 way  RTB x15 2 way x10 with lt hand over head   Exercise 10: er/ir RTb x15   Exercise 11: dynamic stab 3 way x10   Exercise 15: SCI fit- l3.5 3 min retro 3 min foward   Exercise 17: quadruped lateral/ front and back , quadruped ue lifts   Exercise 20: HEP quadruped     Manual: []  NA   Manual therapy  Other: MFD cupping to scap region, paraspinals    Modalities: []  NA  Modalities  Moist heat: 10 min with IFC  E-stim (parameters): 10 with MH to lt scap        HEP  Continue with current Home Exercise Program.  See Objective section for progression of HEP. Comments:       POST-PAIN    Pain Rating (0-10 pain scale): 0/10  Location and Pain Description same as pre-pain unless otherwise indicated.   Action: [] NA  [] Call Physician  [x] Perform HEP  [] Meds as prescribed     ASSESSMENT:     Conditions Requiring Skilled Therapeutic Intervention  Assessment: Pt with

## 2021-04-21 DIAGNOSIS — S99.912A LEFT ANKLE INJURY, INITIAL ENCOUNTER: Primary | ICD-10-CM

## 2021-04-22 ENCOUNTER — HOSPITAL ENCOUNTER (OUTPATIENT)
Dept: PHYSICAL THERAPY | Age: 21
Setting detail: THERAPIES SERIES
Discharge: HOME | End: 2021-04-22
Payer: COMMERCIAL

## 2021-04-22 PROCEDURE — 97140 MANUAL THERAPY 1/> REGIONS: CPT

## 2021-04-22 PROCEDURE — G0283 ELEC STIM OTHER THAN WOUND: HCPCS

## 2021-04-22 PROCEDURE — 97110 THERAPEUTIC EXERCISES: CPT

## 2021-04-22 ASSESSMENT — PAIN DESCRIPTION - DESCRIPTORS: DESCRIPTORS: TIGHTNESS

## 2021-04-22 NOTE — PROGRESS NOTES
[facility]   Outpatient Physical Therapy   Treatment Note  [] [address]  [x] [facility]            of [address]  Date: 2021  Patient: Andie Ross  : 2000  ACCT #: [de-identified]  Referring Practitioner: Girma Justice MD  Diagnosis: Upper back pain on left side    Visit Information:  PT Visit Information  PT Insurance Information: [employer]  Total # of Visits Approved: 30  Total # of Visits to Date: 16  No Show: 0  Canceled Appointment: 0  Progress Note Counter:     SUBJECTIVE:   Subjective  Subjective: Pt reports that she was lifting a 15 # box at work with increased pain/tightness in scap  HEP Compliance:  [x] Good [] Fair [] Poor [] Reports not doing due to:    PAIN   Location:     scap  Pain Rating (0-10 pain scale):  Pain Level: 3  Pain Description:  Pain Descriptors: Tightness  Action:  [x] Acceptable for treatment  []  Other:    OBJECTIVE:   Exercises  Exercise 3: wall push ups x10   Exercise 5: door way stretch 3x30   Exercise 6: barrrel stretch 2 directions   Exercise 9: chest pullls 3 way  RTB x15 2 way x10 with lt hand over head   Exercise 11: dynamic stab 3 way x10   Exercise 12: carried 2 boxes with increased tightness when hugging box vs carrying under. Exercise 15: SCI fit- L4 3 min retro 3 min foward   Exercise 20: HEP barrel stretch     Manual: []  NA   Manual therapy  Soft Tissue Mobalization: stm to lt scap  Other: MFD cupping to scap region, paraspinals    Modalities: []  NA  Modalities  Moist heat: 15 min with IFC  E-stim (parameters): 15 with MH to lt scap      HEP  Continue with current Home Exercise Program.  See Objective section for progression of HEP. Comments:       POST-PAIN    Pain Rating (0-10 pain scale): 3/10  Location and Pain Description same as pre-pain unless otherwise indicated.   Action: [] NA  [] Call Physician  [x] Perform HEP  [] Meds as prescribed     ASSESSMENT:     Conditions Requiring Skilled

## 2021-04-27 ENCOUNTER — HOSPITAL ENCOUNTER (OUTPATIENT)
Dept: PHYSICAL THERAPY | Age: 21
Setting detail: THERAPIES SERIES
Discharge: HOME | End: 2021-04-27
Payer: COMMERCIAL

## 2021-04-27 PROCEDURE — 97110 THERAPEUTIC EXERCISES: CPT

## 2021-04-27 PROCEDURE — G0283 ELEC STIM OTHER THAN WOUND: HCPCS

## 2021-04-27 PROCEDURE — 97140 MANUAL THERAPY 1/> REGIONS: CPT

## 2021-04-27 ASSESSMENT — PAIN SCALES - GENERAL: PAINLEVEL_OUTOF10: 0

## 2021-04-27 ASSESSMENT — PAIN DESCRIPTION - ORIENTATION: ORIENTATION: LEFT

## 2021-04-27 NOTE — PROGRESS NOTES
[address]   [address] [place], [place]  Phone:  [phone]   Fax:  [phone]    [] Certification  [] Recertification [x]  Plan of Care  [] Progress Note   [] Discharge        To: Jessica Gregg MD  From:  Abundio Munguia, PT  Patient: Daniel Patton     : 2000  Diagnosis: Upper back pain on left side     Date: 2021  Treatment Diagnosis: decreased UE strength, decreased left shoulder ROM, left scapula pain       Progress Report Period from: 3/25/21  to 2021    Total # of Visits to Date: 18   No Show: 0    Canceled Appointment: 0     OBJECTIVE:   Short Term Goals - Time Frame for Short term goals: 4 weeks    Goals Current/Discharge status  Met   Short term goal 1: Patient will report </= 1/10 pain in left upper back and scapula with work activities. 3/10 lt upper back with lifting at work  [] yes  [x] no   Short term goal 2: Patient will be independent with HEP. Progressing HEP  [] yes  [x] no     Long Term Goals - Time Frame for Long term goals : 6 weeks  Goals Current/ Discharge status Met   Long term goal 1: Patient will increase left shoulder ROM to [facility] for improved reach. AROM LUE (degrees)  L Shoulder Flexion 0-180: WFL  L Shoulder ABduction 0-180: 160  L Shoulder Int Rotation  0-70: 80  L Shoulder Ext Rotation  0-90: 70      [x] yes  [] no   Long term goal 2: Patient will increase strength in left shoulder and periscapulars >/= 4/5 manual muscle grade for improved tolerance. (updated goal)   Strength RUE  Comment: rhomboid/middle trap 4-  R Shoulder Flexion: 4+/5  R Shoulder Extension: 4+/5  R Shoulder ABduction: 4+/5  R Shoulder Internal Rotation: 4/5  R Shoulder External Rotation: 4/5  R Elbow Flexion: 4+/5  R Elbow Extension: 4-/5  R Wrist Flexion: 5/5  R Wrist Extension: 5/5  Strength LUE  Comment: rhomboid/middle trap 4-  L Shoulder Flexion: 4+/5  L Shoulder Extension: 4+/5  L Shoulder ABduction: 4+/5  L Shoulder Internal Rotation:

## 2021-04-27 NOTE — PROGRESS NOTES
[facility]   Outpatient Physical Therapy   Treatment Note  [] [address]  [x] [facility]            of [address]  Date: 2021  Patient: Rafia Parada  : 2000  ACCT #: [de-identified]  Referring Practitioner:  Yamil Christianson MD  Diagnosis: Upper back pain on left side    Visit Information:  PT Visit Information  PT Insurance Information: [employer]  Total # of Visits Approved: 30  Total # of Visits to Date: 25  No Show: 0  Canceled Appointment: 0  Progress Note Counter:     SUBJECTIVE:   Subjective  Subjective: pt reports highest pain pain in last wwek 3/10  HEP Compliance:  [x] Good [] Fair [] Poor [] Reports not doing due to:    PAIN   Location:      Pain Rating (0-10 pain scale):  Pain Level: 0  Pain Description:  Pain Descriptors: Tightness  Action:  [] Acceptable for treatment  []  Other:    OBJECTIVE:   Exercises  Exercise 5: door way stretch 3x30   Exercise 6: barrrel stretch 2 directions   Exercise 8: YTB flexion, abduction to 90   Exercise 10: er/ir RTb x15   Exercise 15: SCI fit- L4 3 min retro 3 min foward     Manual: []  NA   Manual therapy  Soft Tissue Mobalization: stm to lt scap  Other: MFD cupping to scap region, paraspinals    Modalities: []  NA  Modalities  Moist heat: 10 min with IFC  E-stim (parameters): 10 with MH to lt scap        Strength: [] NT   Strength RUE  Comment: rhomboid/middle trap 4-  R Shoulder Flexion: 4+/5  R Shoulder Extension: 4+/5  R Shoulder ABduction: 4+/5  R Shoulder Internal Rotation: 4/5  R Shoulder External Rotation: 4/5  R Elbow Flexion: 4+/5  R Elbow Extension: 4-/5  R Wrist Flexion: 5/5  R Wrist Extension: 5/5  Strength LUE  Comment: rhomboid/middle trap 4-  L Shoulder Flexion: 4+/5  L Shoulder Extension: 4+/5  L Shoulder ABduction: 4+/5  L Shoulder Internal Rotation: 4/5  L Shoulder External Rotation: 4/5  L Elbow Flexion: 4+/5  L Elbow Extension: 4-/5  L Wrist Flexion: 5/5  L Wrist Extension: 5/5       ROM: [] NT   AROM LUE (degrees)  L Shoulder Flexion 0-180: WFL  L Shoulder ABduction 0-180: 160  L Shoulder Int Rotation  0-70: 80  L Shoulder Ext Rotation  0-90: 70       HEP  Continue with current Home Exercise Program.  See Objective section for progression of HEP. Comments:       POST-PAIN    Pain Rating (0-10 pain scale): 0/10  Location and Pain Description same as pre-pain unless otherwise indicated. Action: [] NA  [] Call Physician  [x] Perform HEP  [] Meds as prescribed     ASSESSMENT:     Conditions Requiring Skilled Therapeutic Intervention  Assessment: Pt with cont decreased muscle strength. Pt with improving ROM and overall pain. Exam: uefi 65/80        Tolerance to treatment:  [x] Good   [] Fair   [] Poor  [] Fatigued   [] Increased pain   Limited by:    Goals:     Short term goals  Time Frame for Short term goals: 4 weeks  Short term goal 1: Patient will report </= 1/10 pain in left upper back and scapula with work activities. Short term goal 2: Patient will be independent with HEP. Long term goals  Time Frame for Long term goals : 6 weeks  Long term goal 1: Patient will increase left shoulder ROM to [facility] for improved reach. Long term goal 2: Patient will increase strength in left shoulder and periscapulars >/= 1/2 manual muscle grade for improved tolerance. Long term goal 3: UEFI >/= 62/80 to demonstrate functional improvements. Progress toward goals:   Goals Met:    [x]  See updated POC   Comments:    PLAN:  [x] Continue POC to pt tolerance  [] Hold PT for ___ days.   See note to physician  [] Discharge PT    Signature:   Electronically signed by Carry Heimlich, PTA on 4/27/21 at 1:30 PM EDT    PT Individual Minutes  Time In: 1110  Time Out: 1353  Minutes: 52  Timed Code Treatment Minutes: 42 Minutes    Activity Minutes Units   Ther Ex 32 2   Manual   10  1   Estim 10 1

## 2021-05-04 ENCOUNTER — HOSPITAL ENCOUNTER (OUTPATIENT)
Dept: PHYSICAL THERAPY | Age: 21
Setting detail: THERAPIES SERIES
Discharge: HOME | End: 2021-05-04
Payer: COMMERCIAL

## 2021-05-04 PROCEDURE — 97140 MANUAL THERAPY 1/> REGIONS: CPT

## 2021-05-04 PROCEDURE — 97110 THERAPEUTIC EXERCISES: CPT

## 2021-05-04 PROCEDURE — G0283 ELEC STIM OTHER THAN WOUND: HCPCS

## 2021-05-04 ASSESSMENT — PAIN DESCRIPTION - ORIENTATION: ORIENTATION: LEFT

## 2021-05-04 NOTE — PROGRESS NOTES
[facility]   Outpatient Physical Therapy   Treatment Note  [] [address]  [x] [facility]            of [address]  Date: 2021  Patient: Terry Waldrop  : 2000  ACCT #: [de-identified]  Referring Practitioner: Artemio Sim MD  Diagnosis: Upper back pain on left side    Visit Information:  PT Visit Information  PT Insurance Information: [employer]  Total # of Visits Approved: 30  Total # of Visits to Date: 23  No Show: 0  Canceled Appointment: 0  Progress Note Counter:     SUBJECTIVE:   Subjective  Subjective: pt reports more pain when she ruiz bad posture. HEP Compliance:  [x] Good [] Fair [] Poor [] Reports not doing due to:    PAIN    Location:    scap  Pain Rating (0-10 pain scale):  Pain Level: 2  Pain Description:  Pain Descriptors: Tightness  Action:  [] Acceptable for treatment  []  Other:    OBJECTIVE:   Exercises  Exercise 5: door way stretch 3x30   Exercise 6: barrrel stretch 2 directions   Exercise 7: prone scap 4 way with 5# wt x10  Exercise 8: RTB flexion, abduction to 90   Exercise 9: chest pullls 3 way  RTB x15 2 way x10 with lt hand over head   Exercise 11: quadruped opp ue le lifs  Exercise 12: qudruped ue twist   Exercise 13: cat in quadruped   Exercise 15: SCI fit- L4 3 min retro 3 min foward   Exercise 20: HEP quadruped     Manual: []  NA   Manual therapy  Other: MFD cupping to scap region, paraspinals    Modalities: []  NA  Modalities  Moist heat: 10 min with IFC  E-stim (parameters): 10 with MH to lt scap        HEP  Continue with current Home Exercise Program.  See Objective section for progression of HEP. Comments:       POST-PAIN    Pain Rating (0-10 pain scale): 0/10  Location and Pain Description same as pre-pain unless otherwise indicated.   Action: [] NA  [] Call Physician  [x] Perform HEP  [] Meds as prescribed     ASSESSMENT:     Conditions Requiring Skilled Therapeutic Intervention  Assessment: Pt with good ricco to increase weight and tband resistance. Pt with some cont pain with chest pulls with lt hand over head. Initiiated some thoracic strengthening to decreased pain. Tolerance to treatment:  [x] Good   [] Fair   [] Poor  [] Fatigued   [] Increased pain   Limited by:    Goals:     Short term goals  Time Frame for Short term goals: 4 weeks  Short term goal 1: Patient will report </= 1/10 pain in left upper back and scapula with work activities. Short term goal 2: Patient will be independent with HEP. Long term goals  Time Frame for Long term goals : 6 weeks  Long term goal 1: Patient will increase left shoulder ROM to [facility] for improved reach. Long term goal 2: Patient will increase strength in left shoulder and periscapulars >/= 4/5 manual muscle grade for improved tolerance. (updated goal)  Long term goal 3: UEFI >/= 62/80 to demonstrate functional improvements. Progress toward goals: strength  Goals Met:    []  See updated POC   Comments:    PLAN:  [x] Continue POC to pt tolerance  [] Hold PT for ___ days.   See note to physician  [] Discharge PT    Signature:   Electronically signed by Hellen Roth PTA on 5/4/21 at 1:21 PM EDT    PT Individual Minutes  Time In: 1101  Time Out: 4304  Minutes: 54  Timed Code Treatment Minutes: 44 Minutes    Activity Minutes Units   Ther Ex 36 2   Manual   8  1   Estim 10 1

## 2021-05-11 ENCOUNTER — HOSPITAL ENCOUNTER (OUTPATIENT)
Dept: PHYSICAL THERAPY | Age: 21
Setting detail: THERAPIES SERIES
Discharge: HOME | End: 2021-05-11
Payer: COMMERCIAL

## 2021-05-11 PROCEDURE — G0283 ELEC STIM OTHER THAN WOUND: HCPCS

## 2021-05-11 PROCEDURE — 97110 THERAPEUTIC EXERCISES: CPT

## 2021-05-11 ASSESSMENT — PAIN DESCRIPTION - ORIENTATION: ORIENTATION: LEFT

## 2021-05-11 ASSESSMENT — PAIN DESCRIPTION - DESCRIPTORS: DESCRIPTORS: TIGHTNESS

## 2021-05-11 NOTE — PROGRESS NOTES
[facility]   Outpatient Physical Therapy   Treatment Note  [] [address]  [x] [facility]  Date: 2021  Patient: Nadira Blackman  : 2000  ACCT #: [de-identified]  Referring Practitioner: Wei De La O MD  Diagnosis: Upper back pain on left side    Visit Information:  PT Visit Information  PT [facility] Information: [facility]  Total # of Visits Approved: 30  Total # of Visits to Date: 20  No Show: 0  Canceled Appointment: 0  Progress Note Counter:     SUBJECTIVE:   Subjective  Subjective: Pt reports no real pain just tightness in shoulder blade area   HEP Compliance:  [x] Good [] Fair [] Poor [] Reports not doing due to:    PAIN   Location:     lt scap  Pain Rating (0-10 pain scale):  Pain Level: 3  Pain Description:  Pain Descriptors: Tightness  Action:  [x] Acceptable for treatment  []  Other:    OBJECTIVE:   Exercises  Exercise 6: barrrel stretch 2 directions   Exercise 7: prone scap 4 way with 5# wt x10  Exercise 8: RTB flexion, abduction to 90   Exercise 9: chest pullls 3 way  RTB x15 2 way x15, t86ahnt lt hand over head   Exercise 11: quadruped opp ue le lifs  Exercise 12: qudruped ue twist   Exercise 13: cat in quadruped   Exercise 15: SCI fit- L4.5 3 min retro 3 min foward   Exercise 20: HEp cont current     Manual: []  NA   Manual therapy  Other: MFD cupping to scap region, paraspinals    Modalities: []  NA  Modalities  Moist heat: 10 min with IFC  E-stim (parameters): 10 with MH to lt scap        HEP  Continue with current Home Exercise Program.  See Objective section for progression of HEP. Comments:       POST-PAIN    Pain Rating (0-10 pain scale):  0/10  Location and Pain Description same as pre-pain unless otherwise indicated.   Action: [] NA  [] Call Physician  [x] Perform HEP  [] Meds as prescribed     ASSESSMENT:     Conditions Requiring Skilled Therapeutic Intervention  Assessment: Pt with no increased pain with exercises. Pt with cont difficulty with overhead scap activiities. Tolerance to treatment:  [x] Good   [] Fair   [] Poor  [] Fatigued   [] Increased pain   Limited by:    Goals:     Short term goals  Time Frame for Short term goals: 4 weeks  Short term goal 1: Patient will report </= 1/10 pain in left upper back and scapula with work activities. Short term goal 2: Patient will be independent with HEP. Long term goals  Time Frame for Long term goals : 6 weeks  Long term goal 1: Patient will increase left shoulder ROM to [facility] for improved reach. Long term goal 2: Patient will increase strength in left shoulder and periscapulars >/= 4/5 manual muscle grade for improved tolerance. (updated goal)  Long term goal 3: UEFI >/= 62/80 to demonstrate functional improvements. Progress toward goals: pain  Goals Met:    []  See updated POC   Comments:    PLAN:  [x] Continue POC to pt tolerance  [] Hold PT for ___ days.   See note to physician  [] Discharge PT    Signature:   Electronically signed by Piero Nicholson PTA on 5/11/21 at 1:39 PM EDT    PT Individual Minutes  Time In: 8561  Time Out: 1055  Minutes: 53  Timed Code Treatment Minutes: 43 Minutes    Activity Minutes Units   Ther Ex 38 3   Manual   5  0   Estim 10 1

## 2021-05-18 ENCOUNTER — HOSPITAL ENCOUNTER (OUTPATIENT)
Dept: PHYSICAL THERAPY | Age: 21
Setting detail: THERAPIES SERIES
Discharge: HOME | End: 2021-05-18
Payer: COMMERCIAL

## 2021-05-18 PROCEDURE — G0283 ELEC STIM OTHER THAN WOUND: HCPCS

## 2021-05-18 PROCEDURE — 97110 THERAPEUTIC EXERCISES: CPT

## 2021-05-18 PROCEDURE — 97140 MANUAL THERAPY 1/> REGIONS: CPT

## 2021-05-18 ASSESSMENT — PAIN DESCRIPTION - ORIENTATION: ORIENTATION: LEFT

## 2021-05-18 NOTE — PROGRESS NOTES
Meds as prescribed     ASSESSMENT:     Conditions Requiring Skilled Therapeutic Intervention  Assessment: Pt still reports pain 4/10 at work with some activities. Pt able to put heat and take ibuprofen to decrease. Pt to be on hold due to starting new job. Exam: uefi 72/80        Tolerance to treatment:  [x] Good   [] Fair   [] Poor  [] Fatigued   [] Increased pain   Limited by:    Goals:     Short term goals  Time Frame for Short term goals: 4 weeks  Short term goal 1: Patient will report </= 1/10 pain in left upper back and scapula with work activities. Short term goal 2: Patient will be independent with HEP. Long term goals  Time Frame for Long term goals : 6 weeks  Long term goal 1: Patient will increase left shoulder ROM to [facility] for improved reach. Long term goal 2: Patient will increase strength in left shoulder and periscapulars >/= 4/5 manual muscle grade for improved tolerance. (updated goal)  Long term goal 3: UEFI >/= 62/80 to demonstrate functional improvements. Progress toward goals:  Goals Met:    [x]  See updated POC   Comments:    PLAN:  [] Continue POC to pt tolerance  [x] Hold PT for _30__ days.   See note to physician  [] Discharge PT    Signature:   Electronically signed by Edmond Quinones PTA on 5/18/21 at 2:08 PM EDT    PT Individual Minutes  Time In: 1100  Time Out: 9589  Minutes: 55  Timed Code Treatment Minutes: 40 Minutes    Activity Minutes Units   Ther Ex 30 2   Manual  10  1   Estim 15 1

## 2021-05-18 NOTE — PROGRESS NOTES
[address]   [address] [place], [place]  Phone:  [phone]   Fax:  [phone]    [] Certification  [] Recertification []  Plan of Care  [x] Progress Note   [] Discharge        To: Roverto Dueñas MD  From:  Yadira Nagel, PT  Patient: Dante Escobar     : 2000  Diagnosis: Upper back pain on left side     Date: 2021  Treatment Diagnosis: decreased UE strength, decreased left shoulder ROM, left scapula pain       Progress Report Period from: 21  to 2021    Total # of Visits to Date: 21   No Show: 0    Canceled Appointment: 0     OBJECTIVE:   Short Term Goals - Time Frame for Short term goals: 4 weeks    Goals Current/Discharge status  Met   Short term goal 1: Patient will report </= 1/10 pain in left upper back and scapula with work activities. Pain still increased to 4/10 after 8 hrs at work  [] yes  [x] no   Short term goal 2: Patient will be independent with HEP. Pt I with HEP  [x] yes  [] no     Long Term Goals - Time Frame for Long term goals : 6 weeks  Goals Current/ Discharge status Met   Long term goal 1: Patient will increase left shoulder ROM to [facility] for improved reach. AROM LUE (degrees)  L Shoulder Flexion 0-180: WFL  L Shoulder ABduction 0-180: WFL      [x] yes  [] no   Long term goal 2: Patient will increase strength in left shoulder and periscapulars >/= 4/5 manual muscle grade for improved tolerance. (updated goal)    Strength RUE  Comment: rhomboid/middle trap 4+  Strength LUE  Comment: rhomboid/middle trap 4+    [x] yes  [] no   Long term goal 3: UEFI >/= 62/80 to demonstrate functional improvements. UEFI 72/80 [x] yes  [] no     Assessment: Patient has made progress since coming to PT in ROM and strength. Continues to reports some pain with work activities. Patient will be on hold for 30 days to see if she can manage symptoms at home.   New Education Provided:  continue with HEP    PLAN:   Frequency/Duration: hold for 30 days        Precautions:             Patient Status:[x] Hold pt 30 days for transition to new position    [] Discharge PT. Recommend pt continue with HEP. [] Additional visits requested, Please re-certify for additional visits:        Signature: objective information byElectronically signed by Rock Hooker PTA on 5/18/21 at 2:10 PM EDT  Electronically signed by Nhi Mendes PT on 5/28/2021 at 8:54 AM        If you have any questions or concerns, please don't hesitate to call. Thank you for your referral.    I have reviewed this plan of care and certify a need for medically necessary rehabilitation services.     Physician Signature:__________________________________________________________  Date:  Please sign and return

## 2021-06-02 ENCOUNTER — OFFICE VISIT (OUTPATIENT)
Dept: PRIMARY CARE CLINIC | Age: 21
End: 2021-06-02
Payer: COMMERCIAL

## 2021-06-02 VITALS
BODY MASS INDEX: 33.06 KG/M2 | OXYGEN SATURATION: 99 % | HEART RATE: 62 BPM | RESPIRATION RATE: 18 BRPM | HEIGHT: 63 IN | DIASTOLIC BLOOD PRESSURE: 64 MMHG | WEIGHT: 186.6 LBS | SYSTOLIC BLOOD PRESSURE: 96 MMHG

## 2021-06-02 DIAGNOSIS — G43.019 INTRACTABLE MIGRAINE WITHOUT AURA AND WITHOUT STATUS MIGRAINOSUS: ICD-10-CM

## 2021-06-02 DIAGNOSIS — F33.9 EPISODE OF RECURRENT MAJOR DEPRESSIVE DISORDER, UNSPECIFIED DEPRESSION EPISODE SEVERITY (HCC): Primary | ICD-10-CM

## 2021-06-02 DIAGNOSIS — J30.9 ALLERGIC RHINITIS, UNSPECIFIED SEASONALITY, UNSPECIFIED TRIGGER: ICD-10-CM

## 2021-06-02 DIAGNOSIS — Z00.00 HEALTH CARE MAINTENANCE: ICD-10-CM

## 2021-06-02 PROCEDURE — 99213 OFFICE O/P EST LOW 20 MIN: CPT | Performed by: INTERNAL MEDICINE

## 2021-06-02 PROCEDURE — 1036F TOBACCO NON-USER: CPT | Performed by: INTERNAL MEDICINE

## 2021-06-02 PROCEDURE — G8417 CALC BMI ABV UP PARAM F/U: HCPCS | Performed by: INTERNAL MEDICINE

## 2021-06-02 PROCEDURE — G8427 DOCREV CUR MEDS BY ELIG CLIN: HCPCS | Performed by: INTERNAL MEDICINE

## 2021-06-02 RX ORDER — CITALOPRAM 10 MG/1
10 TABLET ORAL DAILY
Qty: 60 TABLET | Refills: 0 | Status: SHIPPED | OUTPATIENT
Start: 2021-06-02 | End: 2021-09-07 | Stop reason: SDUPTHER

## 2021-06-02 ASSESSMENT — ENCOUNTER SYMPTOMS
RHINORRHEA: 1
ABDOMINAL PAIN: 0
NAUSEA: 0
SORE THROAT: 1
SHORTNESS OF BREATH: 0
COUGH: 0
SINUS PRESSURE: 1
DIARRHEA: 0
WHEEZING: 0
SINUS PAIN: 0
VOMITING: 0

## 2021-06-02 NOTE — PROGRESS NOTES
Gatherings with Friends and Family:     Attends Bahai Services:     Active Member of Clubs or Organizations:     Attends Club or Organization Meetings:     Marital Status:    Intimate Partner Violence:     Fear of Current or Ex-Partner:     Emotionally Abused:     Physically Abused:     Sexually Abused:      Family History   Problem Relation Age of Onset    Thyroid Disease Other      Allergies:  Patient has no known allergies. There is no problem list on file for this patient. Current Outpatient Medications on File Prior to Visit   Medication Sig Dispense Refill    norgestimate-ethinyl estradiol (ORTHO-CYCLEN, 28,) 0.25-35 MG-MCG per tablet Take 1 tablet by mouth daily 1 packet 3    SUMAtriptan (IMITREX) 50 MG tablet Take 1 tablet by mouth once as needed for Migraine Take at the slightest hint of a headache 9 tablet 1    propranolol (INDERAL LA) 60 MG extended release capsule Take 1 capsule by mouth daily 30 capsule 3    ketorolac (TORADOL) 10 MG tablet Take 1 tablet by mouth every 6 hours as needed for Pain 20 tablet 0    gabapentin (NEURONTIN) 100 MG capsule Take 1 capsule by mouth 3 times daily as needed (numbness, tingling) for up to 30 days. Intended supply: 90 days 90 capsule 1     No current facility-administered medications on file prior to visit. Review of Systems   Constitutional: Negative for chills, diaphoresis, fatigue and fever. HENT: Positive for rhinorrhea, sinus pressure, sneezing and sore throat. Negative for congestion, ear discharge, ear pain and sinus pain. Respiratory: Negative for cough, shortness of breath and wheezing. Cardiovascular: Negative for chest pain. Gastrointestinal: Negative for abdominal pain, diarrhea, nausea and vomiting. Endocrine: Negative for cold intolerance and heat intolerance. Genitourinary: Negative for dysuria and frequency. Neurological: Negative for dizziness and light-headedness.    Psychiatric/Behavioral: Negative for dysphoric mood. The patient is not nervous/anxious. Objective:   BP 96/64 (Site: Left Upper Arm, Position: Sitting, Cuff Size: Medium Adult)   Pulse 62   Resp 18   Ht 5' 3\" (1.6 m)   Wt 186 lb 9.6 oz (84.6 kg)   SpO2 99%   BMI 33.05 kg/m²     Physical Exam  Constitutional:       General: She is not in acute distress. Appearance: Normal appearance. She is well-developed. She is not diaphoretic. Cardiovascular:      Rate and Rhythm: Normal rate and regular rhythm. Pulses: Normal pulses. Heart sounds: Normal heart sounds, S1 normal and S2 normal.   Pulmonary:      Effort: Pulmonary effort is normal. No respiratory distress. Breath sounds: Normal breath sounds. No wheezing or rales. Chest:      Chest wall: No tenderness. Abdominal:      General: Bowel sounds are normal. There is no distension. Palpations: Abdomen is soft. There is no mass. Tenderness: There is no rebound. Neurological:      Mental Status: She is alert. Assessment:       Diagnosis Orders   1. Episode of recurrent major depressive disorder, unspecified depression episode severity ([address] Inadequately Controlled citalopram (CELEXA) 10 MG tablet    Elisabeth Lozano, PhD, Psychology, [place]   2. Allergic rhinitis, unspecified seasonality, unspecified trigger  External Referral to Allergy   3. Health care maintenance  Tetanus-Diphth-Acell Pertussis (BOOSTRIX) injection 0.5 mL   4.  Intractable migraine without aura and without status migrainosus Controlled     on Inderal      Plan:      Orders Placed This Encounter   Procedures    External Referral to Allergy     Referral Priority:   Routine     Referral Type:   Eval and Treat     Referral Reason:   Specialty Services Required     Requested Specialty:   Allergy     Number of Visits Requested:   Aldo Saenz, PhD, Psychology, [place]     Referral Priority:   Routine     Referral Type:   Eval and Treat     Referral Reason:   Specialty Services Required     Referred to Provider:   Joselin Schmitt, PhD     Requested Specialty:   Psychology     Number of Visits Requested:   1     Orders Placed This Encounter   Medications    citalopram (CELEXA) 10 MG tablet     Sig: Take 1 tablet by mouth daily     Dispense:  60 tablet     Refill:  0    Tetanus-Diphth-Acell Pertussis (BOOSTRIX) injection 0.5 mL     Return in about 3 months (around 9/2/2021) for follow up.

## 2021-06-21 ENCOUNTER — CLINICAL DOCUMENTATION (OUTPATIENT)
Dept: PHYSICAL THERAPY | Age: 21
End: 2021-06-21

## 2021-06-21 NOTE — PROGRESS NOTES
[facility]    []  [address]  [x]  [address]      [address] [place] [address]  Ph: [phone]      Ph: [phone]  Fax: [phone]      Fax: [phone]      Date: 2021  Patient Name: Michele Edmonds  : 2000  MRN: [id number]    Pt not scheduled with any further appointments, was previously placed on hold. Attempted to contact pt:  [x]  Left message for pt to call back. []  Called patient, but unable to leave message. Will hold chart open for 30 days from last appointment. Will D/C if no response.      Electronically signed by Ale Adair PTA on 2021 at 11:46 AM

## 2021-07-01 ENCOUNTER — CLINICAL DOCUMENTATION (OUTPATIENT)
Dept: PHYSICAL THERAPY | Age: 21
End: 2021-07-01

## 2021-07-01 NOTE — DISCHARGE SUMMARY
[facility]    []  [address]  [x]  [address]      [address] [place] [address]  Ph: [phone]     Ph: [phone]  Fax: [phone]     Fax: [phone]    [] Certification  [] Recertification []  Plan of Care  [] Progress Note [x] Discharge    Date: 2021  Patient Name: Yasmani Hobbs  : 2000  MRN: [id number]    To:   Lashell Zuñiga MD    From: [facility], PT     [x]    Patient was previously on hold since 21. At this time due to lapse in care, patient will be discharged from PT. Please refer to objective measurements on 21. Comments: Thank you for your referral and the opportunity to treat this patient. Please contact us with any questions or concerns.

## 2021-07-11 DIAGNOSIS — G43.019 INTRACTABLE MIGRAINE WITHOUT AURA AND WITHOUT STATUS MIGRAINOSUS: ICD-10-CM

## 2021-07-12 DIAGNOSIS — G43.019 INTRACTABLE MIGRAINE WITHOUT AURA AND WITHOUT STATUS MIGRAINOSUS: ICD-10-CM

## 2021-07-12 RX ORDER — PROPRANOLOL HCL 60 MG
CAPSULE, EXTENDED RELEASE 24HR ORAL
Qty: 30 CAPSULE | Refills: 0 | OUTPATIENT
Start: 2021-07-12

## 2021-07-14 RX ORDER — PROPRANOLOL HCL 60 MG
60 CAPSULE, EXTENDED RELEASE 24HR ORAL DAILY
Qty: 30 CAPSULE | Refills: 3 | Status: SHIPPED | OUTPATIENT
Start: 2021-07-14 | End: 2021-11-07 | Stop reason: SDUPTHER

## 2021-07-14 NOTE — TELEPHONE ENCOUNTER
Patient requesting medication refill.  Please approve or deny this request.    Rx requested:  Requested Prescriptions     Pending Prescriptions Disp Refills    propranolol (INDERAL LA) 60 MG extended release capsule 30 capsule 3     Sig: Take 1 capsule by mouth daily         Last Office Visit:   6/2/2021      Next Visit Date:  Future Appointments   Date Time Provider Verona Barrett   9/1/2021 12:15 PM MD Verona Reddy

## 2021-09-07 DIAGNOSIS — F33.9 EPISODE OF RECURRENT MAJOR DEPRESSIVE DISORDER, UNSPECIFIED DEPRESSION EPISODE SEVERITY (HCC): ICD-10-CM

## 2021-09-07 RX ORDER — CITALOPRAM 10 MG/1
10 TABLET ORAL DAILY
Qty: 90 TABLET | Refills: 0 | Status: SHIPPED | OUTPATIENT
Start: 2021-09-07 | End: 2021-11-07 | Stop reason: SDUPTHER

## 2021-10-04 RX ORDER — NORGESTIMATE AND ETHINYL ESTRADIOL 0.25-0.035
KIT ORAL
Qty: 28 TABLET | Refills: 6 | Status: SHIPPED | OUTPATIENT
Start: 2021-10-04

## 2021-11-07 DIAGNOSIS — F33.9 EPISODE OF RECURRENT MAJOR DEPRESSIVE DISORDER, UNSPECIFIED DEPRESSION EPISODE SEVERITY (HCC): ICD-10-CM

## 2021-11-07 DIAGNOSIS — G43.019 INTRACTABLE MIGRAINE WITHOUT AURA AND WITHOUT STATUS MIGRAINOSUS: ICD-10-CM

## 2021-11-08 RX ORDER — CITALOPRAM 10 MG/1
10 TABLET ORAL DAILY
Qty: 90 TABLET | Refills: 0 | Status: SHIPPED | OUTPATIENT
Start: 2021-11-08 | End: 2022-02-17

## 2021-11-08 RX ORDER — PROPRANOLOL HCL 60 MG
60 CAPSULE, EXTENDED RELEASE 24HR ORAL DAILY
Qty: 30 CAPSULE | Refills: 3 | Status: SHIPPED | OUTPATIENT
Start: 2021-11-08 | End: 2022-03-16

## 2022-02-17 DIAGNOSIS — F33.9 EPISODE OF RECURRENT MAJOR DEPRESSIVE DISORDER, UNSPECIFIED DEPRESSION EPISODE SEVERITY (HCC): ICD-10-CM

## 2022-02-17 RX ORDER — CITALOPRAM 10 MG/1
TABLET ORAL
Qty: 90 TABLET | Refills: 0 | Status: SHIPPED | OUTPATIENT
Start: 2022-02-17 | End: 2022-04-13

## 2022-03-15 DIAGNOSIS — G43.019 INTRACTABLE MIGRAINE WITHOUT AURA AND WITHOUT STATUS MIGRAINOSUS: ICD-10-CM

## 2022-03-16 RX ORDER — PROPRANOLOL HCL 60 MG
CAPSULE, EXTENDED RELEASE 24HR ORAL
Qty: 30 CAPSULE | Refills: 0 | Status: SHIPPED | OUTPATIENT
Start: 2022-03-16 | End: 2022-06-16 | Stop reason: SDUPTHER

## 2022-04-13 DIAGNOSIS — F33.9 EPISODE OF RECURRENT MAJOR DEPRESSIVE DISORDER, UNSPECIFIED DEPRESSION EPISODE SEVERITY (HCC): ICD-10-CM

## 2022-04-13 RX ORDER — CITALOPRAM 10 MG/1
TABLET ORAL
Qty: 90 TABLET | Refills: 0 | Status: SHIPPED | OUTPATIENT
Start: 2022-04-13 | End: 2022-06-16 | Stop reason: SDUPTHER

## 2022-04-13 RX ORDER — CITALOPRAM 10 MG/1
TABLET ORAL
Qty: 90 TABLET | Refills: 0 | OUTPATIENT
Start: 2022-04-13

## 2022-06-16 DIAGNOSIS — G43.019 INTRACTABLE MIGRAINE WITHOUT AURA AND WITHOUT STATUS MIGRAINOSUS: ICD-10-CM

## 2022-06-16 DIAGNOSIS — F33.9 EPISODE OF RECURRENT MAJOR DEPRESSIVE DISORDER, UNSPECIFIED DEPRESSION EPISODE SEVERITY (HCC): ICD-10-CM

## 2022-06-19 RX ORDER — PROPRANOLOL HCL 60 MG
60 CAPSULE, EXTENDED RELEASE 24HR ORAL DAILY
Qty: 90 CAPSULE | Refills: 3 | Status: SHIPPED | OUTPATIENT
Start: 2022-06-19

## 2022-06-19 RX ORDER — CITALOPRAM 10 MG/1
10 TABLET ORAL DAILY
Qty: 90 TABLET | Refills: 1 | Status: SHIPPED | OUTPATIENT
Start: 2022-06-19

## 2023-03-27 DIAGNOSIS — G43.019 INTRACTABLE MIGRAINE WITHOUT AURA AND WITHOUT STATUS MIGRAINOSUS: ICD-10-CM

## 2023-04-01 RX ORDER — PROPRANOLOL HCL 60 MG
60 CAPSULE, EXTENDED RELEASE 24HR ORAL DAILY
Qty: 60 CAPSULE | Refills: 0 | Status: SHIPPED | OUTPATIENT
Start: 2023-04-01

## 2025-06-15 ENCOUNTER — APPOINTMENT (OUTPATIENT)
Dept: CT IMAGING | Age: 25
End: 2025-06-15
Payer: MEDICAID

## 2025-06-15 ENCOUNTER — APPOINTMENT (OUTPATIENT)
Dept: GENERAL RADIOLOGY | Age: 25
End: 2025-06-15
Payer: MEDICAID

## 2025-06-15 ENCOUNTER — HOSPITAL ENCOUNTER (EMERGENCY)
Age: 25
Discharge: HOME | End: 2025-06-15
Attending: EMERGENCY MEDICINE
Payer: MEDICAID

## 2025-06-15 VITALS
SYSTOLIC BLOOD PRESSURE: 128 MMHG | HEIGHT: 64 IN | DIASTOLIC BLOOD PRESSURE: 89 MMHG | RESPIRATION RATE: 20 BRPM | OXYGEN SATURATION: 98 % | HEART RATE: 85 BPM | TEMPERATURE: 98 F | BODY MASS INDEX: 32.08 KG/M2 | WEIGHT: 187.9 LBS

## 2025-06-15 DIAGNOSIS — H66.90 ACUTE OTITIS MEDIA, UNSPECIFIED OTITIS MEDIA TYPE: Primary | ICD-10-CM

## 2025-06-15 DIAGNOSIS — H81.10 BENIGN PAROXYSMAL POSITIONAL VERTIGO, UNSPECIFIED LATERALITY: ICD-10-CM

## 2025-06-15 DIAGNOSIS — B34.8 PARAINFLUENZA: ICD-10-CM

## 2025-06-15 LAB
ALBUMIN SERPL-MCNC: 5 G/DL (ref 3.5–4.6)
ALP SERPL-CCNC: 67 U/L (ref 40–130)
ALT SERPL-CCNC: 24 U/L (ref 0–33)
ANION GAP SERPL CALCULATED.3IONS-SCNC: 14 MEQ/L (ref 9–15)
APTT PPP: 29.9 SEC (ref 24.4–36.8)
AST SERPL-CCNC: 27 U/L (ref 0–35)
B PARAP IS1001 DNA NPH QL NAA+NON-PROBE: NOT DETECTED
B PERT.PT PRMT NPH QL NAA+NON-PROBE: NOT DETECTED
BASOPHILS # BLD: 0 K/UL (ref 0–0.2)
BASOPHILS NFR BLD: 0.3 %
BILIRUB SERPL-MCNC: <0.2 MG/DL (ref 0.2–0.7)
BUN SERPL-MCNC: 7 MG/DL (ref 6–20)
C PNEUM DNA NPH QL NAA+NON-PROBE: NOT DETECTED
CALCIUM SERPL-MCNC: 9.9 MG/DL (ref 8.5–9.9)
CHLORIDE SERPL-SCNC: 103 MEQ/L (ref 95–107)
CHP ED QC CHECK: YES
CO2 SERPL-SCNC: 25 MEQ/L (ref 20–31)
CREAT SERPL-MCNC: 0.64 MG/DL (ref 0.5–0.9)
EOSINOPHIL # BLD: 0.2 K/UL (ref 0–0.7)
EOSINOPHIL NFR BLD: 1.8 %
ERYTHROCYTE [DISTWIDTH] IN BLOOD BY AUTOMATED COUNT: 12.9 % (ref 11.5–14.5)
FLUAV RNA NPH QL NAA+NON-PROBE: NOT DETECTED
FLUBV RNA NPH QL NAA+NON-PROBE: NOT DETECTED
GLOBULIN SER CALC-MCNC: 3.6 G/DL (ref 2.3–3.5)
GLUCOSE BLD-MCNC: 94 MG/DL
GLUCOSE BLD-MCNC: 94 MG/DL (ref 70–99)
GLUCOSE SERPL-MCNC: 89 MG/DL (ref 70–99)
HADV DNA NPH QL NAA+NON-PROBE: NOT DETECTED
HCG SERPL QL: NEGATIVE
HCOV 229E RNA NPH QL NAA+NON-PROBE: NOT DETECTED
HCOV HKU1 RNA NPH QL NAA+NON-PROBE: NOT DETECTED
HCOV NL63 RNA NPH QL NAA+NON-PROBE: NOT DETECTED
HCOV OC43 RNA NPH QL NAA+NON-PROBE: NOT DETECTED
HCT VFR BLD AUTO: 43.6 % (ref 37–47)
HGB BLD-MCNC: 13.9 G/DL (ref 12–16)
HMPV RNA NPH QL NAA+NON-PROBE: NOT DETECTED
HPIV1 RNA NPH QL NAA+NON-PROBE: NOT DETECTED
HPIV2 RNA NPH QL NAA+NON-PROBE: NOT DETECTED
HPIV3 RNA NPH QL NAA+NON-PROBE: DETECTED
HPIV4 RNA NPH QL NAA+NON-PROBE: NOT DETECTED
INR PPP: 1
LYMPHOCYTES # BLD: 2.3 K/UL (ref 1–4.8)
LYMPHOCYTES NFR BLD: 22.7 %
M PNEUMO DNA NPH QL NAA+NON-PROBE: NOT DETECTED
MAGNESIUM SERPL-MCNC: 2.4 MG/DL (ref 1.7–2.4)
MCH RBC QN AUTO: 28.4 PG (ref 27–31.3)
MCHC RBC AUTO-ENTMCNC: 31.9 % (ref 33–37)
MCV RBC AUTO: 89.2 FL (ref 79.4–94.8)
MONOCYTES # BLD: 0.8 K/UL (ref 0.2–0.8)
MONOCYTES NFR BLD: 7.5 %
NEUTROPHILS # BLD: 6.8 K/UL (ref 1.4–6.5)
NEUTS SEG NFR BLD: 67.2 %
PERFORMED ON: NORMAL
PLATELET # BLD AUTO: 324 K/UL (ref 130–400)
POTASSIUM SERPL-SCNC: 4.5 MEQ/L (ref 3.4–4.9)
PROT SERPL-MCNC: 8.6 G/DL (ref 6.3–8)
PROTHROMBIN TIME: 13.3 SEC (ref 12.3–14.9)
RBC # BLD AUTO: 4.89 M/UL (ref 4.2–5.4)
RSV RNA NPH QL NAA+NON-PROBE: NOT DETECTED
RV+EV RNA NPH QL NAA+NON-PROBE: NOT DETECTED
SARS-COV-2 RNA NPH QL NAA+NON-PROBE: NOT DETECTED
SODIUM SERPL-SCNC: 142 MEQ/L (ref 135–144)
STREP GRP A PCR: NEGATIVE
TROPONIN, HIGH SENSITIVITY: <6 NG/L (ref 0–19)
TSH REFLEX: 1.79 UIU/ML (ref 0.44–3.86)
WBC # BLD AUTO: 10.2 K/UL (ref 4.8–10.8)

## 2025-06-15 PROCEDURE — 85610 PROTHROMBIN TIME: CPT

## 2025-06-15 PROCEDURE — 93005 ELECTROCARDIOGRAM TRACING: CPT | Performed by: EMERGENCY MEDICINE

## 2025-06-15 PROCEDURE — 96374 THER/PROPH/DIAG INJ IV PUSH: CPT

## 2025-06-15 PROCEDURE — 0202U NFCT DS 22 TRGT SARS-COV-2: CPT

## 2025-06-15 PROCEDURE — 2580000003 HC RX 258: Performed by: EMERGENCY MEDICINE

## 2025-06-15 PROCEDURE — 70498 CT ANGIOGRAPHY NECK: CPT

## 2025-06-15 PROCEDURE — 87651 STREP A DNA AMP PROBE: CPT

## 2025-06-15 PROCEDURE — 71045 X-RAY EXAM CHEST 1 VIEW: CPT

## 2025-06-15 PROCEDURE — 83735 ASSAY OF MAGNESIUM: CPT

## 2025-06-15 PROCEDURE — 96375 TX/PRO/DX INJ NEW DRUG ADDON: CPT

## 2025-06-15 PROCEDURE — 36415 COLL VENOUS BLD VENIPUNCTURE: CPT

## 2025-06-15 PROCEDURE — 84484 ASSAY OF TROPONIN QUANT: CPT

## 2025-06-15 PROCEDURE — 70496 CT ANGIOGRAPHY HEAD: CPT

## 2025-06-15 PROCEDURE — 6360000002 HC RX W HCPCS: Performed by: EMERGENCY MEDICINE

## 2025-06-15 PROCEDURE — 84443 ASSAY THYROID STIM HORMONE: CPT

## 2025-06-15 PROCEDURE — 6360000004 HC RX CONTRAST MEDICATION: Performed by: EMERGENCY MEDICINE

## 2025-06-15 PROCEDURE — 84703 CHORIONIC GONADOTROPIN ASSAY: CPT

## 2025-06-15 PROCEDURE — 99285 EMERGENCY DEPT VISIT HI MDM: CPT

## 2025-06-15 PROCEDURE — 85730 THROMBOPLASTIN TIME PARTIAL: CPT

## 2025-06-15 PROCEDURE — 6370000000 HC RX 637 (ALT 250 FOR IP): Performed by: EMERGENCY MEDICINE

## 2025-06-15 PROCEDURE — 80053 COMPREHEN METABOLIC PANEL: CPT

## 2025-06-15 PROCEDURE — 85025 COMPLETE CBC W/AUTO DIFF WBC: CPT

## 2025-06-15 RX ORDER — KETOROLAC TROMETHAMINE 30 MG/ML
30 INJECTION, SOLUTION INTRAMUSCULAR; INTRAVENOUS ONCE
Status: COMPLETED | OUTPATIENT
Start: 2025-06-15 | End: 2025-06-15

## 2025-06-15 RX ORDER — PROCHLORPERAZINE EDISYLATE 5 MG/ML
10 INJECTION INTRAMUSCULAR; INTRAVENOUS ONCE
Status: COMPLETED | OUTPATIENT
Start: 2025-06-15 | End: 2025-06-15

## 2025-06-15 RX ORDER — LORAZEPAM 2 MG/ML
1 INJECTION INTRAMUSCULAR ONCE
Status: COMPLETED | OUTPATIENT
Start: 2025-06-15 | End: 2025-06-15

## 2025-06-15 RX ORDER — MECLIZINE HYDROCHLORIDE 25 MG/1
25 TABLET ORAL ONCE
Status: COMPLETED | OUTPATIENT
Start: 2025-06-15 | End: 2025-06-15

## 2025-06-15 RX ORDER — MECLIZINE HYDROCHLORIDE 25 MG/1
25 TABLET ORAL 2 TIMES DAILY PRN
Qty: 20 TABLET | Refills: 0 | Status: SHIPPED | OUTPATIENT
Start: 2025-06-15 | End: 2025-06-25

## 2025-06-15 RX ORDER — LORAZEPAM 1 MG/1
1 TABLET ORAL
Status: DISCONTINUED | OUTPATIENT
Start: 2025-06-15 | End: 2025-06-15

## 2025-06-15 RX ORDER — 0.9 % SODIUM CHLORIDE 0.9 %
1000 INTRAVENOUS SOLUTION INTRAVENOUS ONCE
Status: COMPLETED | OUTPATIENT
Start: 2025-06-15 | End: 2025-06-15

## 2025-06-15 RX ORDER — BUTALBITAL, ACETAMINOPHEN AND CAFFEINE 300; 40; 50 MG/1; MG/1; MG/1
1 CAPSULE ORAL ONCE
Status: COMPLETED | OUTPATIENT
Start: 2025-06-15 | End: 2025-06-15

## 2025-06-15 RX ORDER — PROCHLORPERAZINE MALEATE 10 MG
10 TABLET ORAL EVERY 8 HOURS PRN
Qty: 30 TABLET | Refills: 0 | Status: SHIPPED | OUTPATIENT
Start: 2025-06-15

## 2025-06-15 RX ORDER — KETOROLAC TROMETHAMINE 10 MG/1
10 TABLET, FILM COATED ORAL EVERY 6 HOURS PRN
Qty: 20 TABLET | Refills: 0 | Status: SHIPPED | OUTPATIENT
Start: 2025-06-15

## 2025-06-15 RX ORDER — DEXAMETHASONE SODIUM PHOSPHATE 10 MG/ML
10 INJECTION, SOLUTION INTRA-ARTICULAR; INTRALESIONAL; INTRAMUSCULAR; INTRAVENOUS; SOFT TISSUE ONCE
Status: COMPLETED | OUTPATIENT
Start: 2025-06-15 | End: 2025-06-15

## 2025-06-15 RX ORDER — IOPAMIDOL 755 MG/ML
75 INJECTION, SOLUTION INTRAVASCULAR
Status: COMPLETED | OUTPATIENT
Start: 2025-06-15 | End: 2025-06-15

## 2025-06-15 RX ADMIN — DEXAMETHASONE SODIUM PHOSPHATE 10 MG: 10 INJECTION INTRAMUSCULAR; INTRAVENOUS at 17:41

## 2025-06-15 RX ADMIN — SODIUM CHLORIDE 1000 ML: 0.9 INJECTION, SOLUTION INTRAVENOUS at 16:33

## 2025-06-15 RX ADMIN — BUTALBITA,ACETAMINOPHEN AND CAFFEINE 1 CAPSULE: 50; 300; 40 CAPSULE ORAL at 16:35

## 2025-06-15 RX ADMIN — PROCHLORPERAZINE EDISYLATE 10 MG: 5 INJECTION INTRAMUSCULAR; INTRAVENOUS at 16:33

## 2025-06-15 RX ADMIN — Medication 1 MG: at 17:40

## 2025-06-15 RX ADMIN — MECLIZINE HYDROCHLORIDE 25 MG: 25 TABLET ORAL at 16:35

## 2025-06-15 RX ADMIN — IOPAMIDOL 75 ML: 755 INJECTION, SOLUTION INTRAVENOUS at 15:26

## 2025-06-15 RX ADMIN — KETOROLAC TROMETHAMINE 30 MG: 30 INJECTION, SOLUTION INTRAMUSCULAR at 17:40

## 2025-06-15 ASSESSMENT — PAIN SCALES - GENERAL
PAINLEVEL_OUTOF10: 7
PAINLEVEL_OUTOF10: 7
PAINLEVEL_OUTOF10: 8

## 2025-06-15 ASSESSMENT — PAIN DESCRIPTION - LOCATION
LOCATION: HEAD
LOCATION: HEAD
LOCATION: EAR;THROAT

## 2025-06-15 ASSESSMENT — PAIN - FUNCTIONAL ASSESSMENT: PAIN_FUNCTIONAL_ASSESSMENT: 0-10

## 2025-06-15 NOTE — ED TRIAGE NOTES
Pt alert and calm. Pt anais even no distress noted. Pt able to follow all commands. Pt c/o HA 8/10.

## 2025-06-15 NOTE — ED PROVIDER NOTES
[facility] EMERGENCY DEPARTMENT  EMERGENCY DEPARTMENT ENCOUNTER      Pt Name: Sabina Alvarez  MRN: [id number]  Birthdate 2000  Date of evaluation: 6/15/2025  Provider: Faisal Rollins MD  Note Started: 6/15/25 2:10 PM EDT    CHIEF COMPLAINT       Chief Complaint   Patient presents with    Dizziness    Ear Fullness         HISTORY OF PRESENT ILLNESS   (Location/Symptom, Timing/Onset, Context/Setting, Quality, Duration, Modifying Factors, Severity)  Note limiting factors.   Sabina Alvarez is a 25 y.o. adult who presents to the emergency department via private vehicle for evaluation of dizziness.  History comes from the patient.  She says that last week she began to feel congested.  This was followed by a wet, nonbloody cough.  It progressed to postnasal drip, sore throat and dry cough.  She then began to have right ear pain greater than left ear pain.  Started to have right-sided pounding headache.  Denies head injury or anticoagulation use, vision change, neck stiffness, chest pain, vomiting or diaphoresis, abdominal pain, calf swelling, numbness or focal weakness.  States that she is dizzy at rest but it is significantly worsened with head movement and position change and ambulation.  No recent surgeries, history of DVT or PE, hemoptysis, exogenous hormone use  HPI  Chart review notes history of anxiety on propranolol/Celexa, depression, diabetes, previous prescriptions for Imitrex and gabapentin  Nursing Notes were reviewed.    REVIEW OF SYSTEMS    (2-9 systems for level 4, 10 or more for level 5)     Review of Systems   Constitutional:  Negative for fever.   HENT:  Positive for congestion, ear pain and sore throat.    Eyes:  Negative for visual disturbance.   Respiratory:  Positive for cough. Negative for shortness of breath.    Cardiovascular:  Negative for chest pain.   Gastrointestinal:  Negative for abdominal pain and vomiting.   Genitourinary:  Negative for dysuria and flank pain.   Musculoskeletal:

## 2025-06-16 LAB
EKG ATRIAL RATE: 75 BPM
EKG DIAGNOSIS: NORMAL
EKG P AXIS: 8 DEGREES
EKG P-R INTERVAL: 112 MS
EKG Q-T INTERVAL: 364 MS
EKG QRS DURATION: 96 MS
EKG QTC CALCULATION (BAZETT): 406 MS
EKG R AXIS: -4 DEGREES
EKG T AXIS: 11 DEGREES
EKG VENTRICULAR RATE: 75 BPM
PERFORMED ON: NORMAL
POC CREATININE: 0.7 MG/DL (ref 0.6–1.2)
POC SAMPLE TYPE: NORMAL